# Patient Record
Sex: MALE | Race: WHITE | Employment: OTHER | URBAN - METROPOLITAN AREA
[De-identification: names, ages, dates, MRNs, and addresses within clinical notes are randomized per-mention and may not be internally consistent; named-entity substitution may affect disease eponyms.]

---

## 2024-01-01 ENCOUNTER — ANTI-COAG VISIT (OUTPATIENT)
Age: 88
End: 2024-01-01

## 2024-07-08 ENCOUNTER — OFFICE VISIT (OUTPATIENT)
Age: 89
End: 2024-07-08

## 2024-07-08 VITALS
TEMPERATURE: 98 F | HEIGHT: 71 IN | OXYGEN SATURATION: 95 % | WEIGHT: 175 LBS | BODY MASS INDEX: 24.5 KG/M2 | RESPIRATION RATE: 18 BRPM | HEART RATE: 104 BPM | DIASTOLIC BLOOD PRESSURE: 67 MMHG | SYSTOLIC BLOOD PRESSURE: 113 MMHG

## 2024-07-08 DIAGNOSIS — R07.81 RIB PAIN ON RIGHT SIDE: Primary | ICD-10-CM

## 2024-07-08 DIAGNOSIS — M43.9 COMPRESSION DEFORMITY OF VERTEBRA: ICD-10-CM

## 2024-07-08 DIAGNOSIS — Q79.1 DIAPHRAGM, EVENTRATION: ICD-10-CM

## 2024-07-08 PROBLEM — D64.9 ANEMIA: Status: ACTIVE | Noted: 2017-02-01

## 2024-07-08 PROBLEM — H52.4 PRESBYOPIA: Status: ACTIVE | Noted: 2019-10-22

## 2024-07-08 PROBLEM — H35.3290 EXUDATIVE AGE-RELATED MACULAR DEGENERATION (HCC): Status: ACTIVE | Noted: 2017-07-21

## 2024-07-08 PROBLEM — H52.223 REGULAR ASTIGMATISM OF BOTH EYES: Status: ACTIVE | Noted: 2019-10-22

## 2024-07-08 PROBLEM — Z78.9 STATIN INTOLERANCE: Status: ACTIVE | Noted: 2018-04-11

## 2024-07-08 PROBLEM — I10 PRIMARY HYPERTENSION: Status: ACTIVE | Noted: 2024-07-08

## 2024-07-08 PROBLEM — I87.2 VENOUS (PERIPHERAL) INSUFFICIENCY: Status: ACTIVE | Noted: 2017-04-19

## 2024-07-08 PROBLEM — M47.817 LUMBOSACRAL SPONDYLOSIS WITHOUT MYELOPATHY: Status: ACTIVE | Noted: 2021-07-04

## 2024-07-08 PROBLEM — Z79.01 ANTICOAGULATED ON COUMADIN: Status: ACTIVE | Noted: 2017-11-13

## 2024-07-08 RX ORDER — FUROSEMIDE 20 MG/1
20 TABLET ORAL DAILY
COMMUNITY

## 2024-07-08 RX ORDER — ALBUTEROL SULFATE 90 UG/1
2 AEROSOL, METERED RESPIRATORY (INHALATION) EVERY 6 HOURS PRN
COMMUNITY

## 2024-07-08 RX ORDER — METOPROLOL SUCCINATE 25 MG/1
25 TABLET, EXTENDED RELEASE ORAL DAILY
COMMUNITY

## 2024-07-08 RX ORDER — METHYLPREDNISOLONE 4 MG/1
4 TABLET ORAL SEE ADMIN INSTRUCTIONS
Qty: 1 KIT | Refills: 0 | Status: SHIPPED | OUTPATIENT
Start: 2024-07-08

## 2024-07-08 RX ORDER — WARFARIN SODIUM 2 MG/1
2 TABLET ORAL
COMMUNITY

## 2024-07-08 ASSESSMENT — ENCOUNTER SYMPTOMS
EYES NEGATIVE: 1
CONSTIPATION: 1
RESPIRATORY NEGATIVE: 1
BACK PAIN: 1
ALLERGIC/IMMUNOLOGIC NEGATIVE: 1

## 2024-07-08 NOTE — PROGRESS NOTES
2024   Timur Ley (: 1927) is a 89 y.o. male, New patient, here for evaluation of the following chief complaint(s):  Rib Pain (Right side flan pain radiating to ribs and lower back for the last 10 days accompanied by loss of appetite and constipation. )     ASSESSMENT/PLAN:  Below is the assessment and plan developed based on review of pertinent history, physical exam, labs, studies, and medications.  1. Rib pain on right side  -     XR RIBS RIGHT INCLUDE CHEST (MIN 3 VIEWS); Future  -     methylPREDNISolone (MEDROL DOSEPACK) 4 MG tablet; Take 1 tablet by mouth See Admin Instructions, Disp-1 kit, R-0Normal  2. Compression deformity of vertebra  -     Christiano Bhatt MD, Orthopedic Surgery (back, neck, spine), [place]  3. Diaphragm, eventration  -     Christiano Bhatt MD, Orthopedic Surgery (back, neck, spine), [place]         Handout given with care instructions  2. OTC for symptom management. Increase fluid intake, ensure adequate nutritional intake.  3. Follow up with PCP as needed.  4. Go to ED with development of any acute symptoms.     Follow up:  Return if symptoms worsen or fail to improve.  Follow up immediately for any new, worsening or changes or if symptoms are not improving over the next 5-7 days.     SUBJECTIVE/OBJECTIVE:  HPI     Diagnoses and all orders for this visit:  Rib pain on right side  Compression deformity of vertebra  Diaphragm, eventration  Rib Pain (Right side flan pain radiating to ribs and lower back for the last 10 days accompanied by loss of appetite and constipation. )    Timur Ley is a 89 y.o. male who presents for evaluation of right-sided rib and right back pain.     Onset: 10 days ago    Location: Right side back and ribs    Setting: Patient denies trauma fall or injury although bruising was noted on right side of the back    Quality of pain: Aching    Severity of pain: 8/10    Radiation: Radiates to the front ribs on the right side    Alleviating

## 2024-07-12 ENCOUNTER — OFFICE VISIT (OUTPATIENT)
Age: 89
End: 2024-07-12
Payer: MEDICARE

## 2024-07-12 VITALS
OXYGEN SATURATION: 96 % | DIASTOLIC BLOOD PRESSURE: 70 MMHG | BODY MASS INDEX: 24.58 KG/M2 | SYSTOLIC BLOOD PRESSURE: 134 MMHG | HEIGHT: 71 IN | HEART RATE: 70 BPM | WEIGHT: 175.6 LBS

## 2024-07-12 DIAGNOSIS — I48.21 PERMANENT ATRIAL FIBRILLATION (HCC): Primary | ICD-10-CM

## 2024-07-12 DIAGNOSIS — I49.5 SICK SINUS SYNDROME (HCC): ICD-10-CM

## 2024-07-12 DIAGNOSIS — I10 PRIMARY HYPERTENSION: ICD-10-CM

## 2024-07-12 DIAGNOSIS — Z95.0 PRESENCE OF CARDIAC PACEMAKER: ICD-10-CM

## 2024-07-12 PROCEDURE — G8427 DOCREV CUR MEDS BY ELIG CLIN: HCPCS | Performed by: INTERNAL MEDICINE

## 2024-07-12 PROCEDURE — 1123F ACP DISCUSS/DSCN MKR DOCD: CPT | Performed by: INTERNAL MEDICINE

## 2024-07-12 PROCEDURE — 93005 ELECTROCARDIOGRAM TRACING: CPT | Performed by: INTERNAL MEDICINE

## 2024-07-12 PROCEDURE — 99204 OFFICE O/P NEW MOD 45 MIN: CPT | Performed by: INTERNAL MEDICINE

## 2024-07-12 PROCEDURE — G8420 CALC BMI NORM PARAMETERS: HCPCS | Performed by: INTERNAL MEDICINE

## 2024-07-12 PROCEDURE — 1036F TOBACCO NON-USER: CPT | Performed by: INTERNAL MEDICINE

## 2024-07-12 PROCEDURE — 93010 ELECTROCARDIOGRAM REPORT: CPT | Performed by: INTERNAL MEDICINE

## 2024-07-12 RX ORDER — FUROSEMIDE 40 MG/1
40 TABLET ORAL DAILY
COMMUNITY

## 2024-07-12 NOTE — PATIENT INSTRUCTIONS
Setup appointment with [facility].     Order INR checks and management of Coumadin dose at [facility]    See Dr. Roper in 6 months.

## 2024-07-12 NOTE — PROGRESS NOTES
Chief Complaint   Patient presents with    Atrial Flutter    Hypertension    Atrial Fibrillation     PAF     Vitals:    07/12/24 0942   BP: 134/70   Site: Left Upper Arm   Position: Sitting   Pulse: 70   SpO2: 96%   Weight: 79.7 kg (175 lb 9.6 oz)   Height: 1.803 m (5' 11\")         Chest pain: DENIED     Recent hospital stays: DENIED     Refills: DENIED   
All negative except for HPI     Physical Exam:  /70 (Site: Left Upper Arm, Position: Sitting)   Pulse 70   Ht 1.803 m (5' 11\")   Wt 79.7 kg (175 lb 9.6 oz)   SpO2 96%   BMI 24.49 kg/m²     Gen:  Well-developed, well-nourished, in no acute distress  HEENT:  Pink conjunctivae, PERRL, hearing intact to voice, moist mucous membranes  Neck:  Supple, without masses, thyroid non-tender  Resp:  No accessory muscle use, clear breath sounds without wheezes rales or rhonchi  Card:  No murmurs, normal S1, S2 without thrills, bruits or peripheral edema  Abd:  Soft, non-tender, non-distended, normoactive bowel sounds are present, no palpable organomegaly and no detectable hernias  Lymph:  No cervical or inguinal adenopathy  Musc:  No cyanosis or clubbing  Skin:  No rashes or ulcers, skin turgor is good  Neuro:  Cranial nerves are grossly intact, no focal motor weakness, follows commands appropriately  Psych:  Good insight, oriented to person, place and time, alert     Labs:     No results found for: \"WBC\", \"WBCT\", \"WBCPOC\", \"HGB\", \"HGBPOC\", \"HCT\", \"HCTPOC\", \"PLT\", \"PLTPOC\", \"MCV\"  No results found for: \"HBA1C\", \"GLU\", \"GESTF\", \"GLUCPOC\", \"MCA2\", \"LDL\", \"KAMALJIT\", \"CREAPOC\"   No results found for: \"CHOL\", \"CHOLPOCT\", \"HDL\", \"LDL\", \"LDLCEXT\"  No results found for: \"ALTPOC\", \"ALT\", \"ASTPOC\", \"GGT\", \"ALBPOC\", \"TP\", \"INR\", \"INREXT\", \"PTINR\", \"PTEXT\", \"PLT\", \"PLTPOC\", \"HCABQL\", \"AFP\"  No results found for: \"INR\", \"INREXT\", \"PT1\"   No results found for: \"GFRAA\", \"CREAPOC\", \"BUN\", \"IBUN\", \"BUNPOC\", \"NA\", \"NAPOC\", \"K\", \"KPOCT\", \"CL\", \"CLPOC\", \"CO2\", \"CO2POC\", \"MG\", \"PHOS\", \"ALBEU\", \"PTH\", \"EPO\"  No results found for: \"PSA\", \"PSA2\", \"PSAR1\", \"PSA1\", \"PSA3\"  No results found for: \"TSH\", \"TSH2\", \"TSH3\", \"TSHELE\", \"TSHEXT\", \"T3RIA\", \"T3UP\", \"FT3\", \"FT4\", \"T4\", \"TT7\"   No results found for: \"GLU\", \"GLUCPOC\"   No results found for: \"CPK\", \"CKMB\", \"BNP\"   No results found for: \"BNP\", \"BNPPOC\", \"BNPNT\"   No results found for: \"NA\", \"K\",

## 2024-07-26 ENCOUNTER — ANTI-COAG VISIT (OUTPATIENT)
Facility: HOSPITAL | Age: 89
End: 2024-07-26
Payer: MEDICARE

## 2024-07-26 DIAGNOSIS — I48.91 ATRIAL FIBRILLATION, UNSPECIFIED TYPE (HCC): Primary | ICD-10-CM

## 2024-07-26 LAB
INTERNATIONAL NORMALIZATION RATIO, POC: 1.8 (ref 2–3)
PROTHROMBIN TIME, POC: ABNORMAL

## 2024-07-26 PROCEDURE — 99203 OFFICE O/P NEW LOW 30 MIN: CPT

## 2024-07-26 PROCEDURE — 85610 PROTHROMBIN TIME: CPT

## 2024-07-26 NOTE — PROGRESS NOTES
Agreement/Protocol:    Patient's POC INR is 1.8 which is slightly subtherapeutic for INR goal of 2-3.   Patient has a kyphoplasty scheduled 7/30/24. Spoke with Sherry Holland NP at [facility] who states to hold warfarin 3 days prior to procedure. Patient will take 1 mg of warfarin today 7/26 and then will hold warfarin 7/27, 7/28, and 7/29 prior to procedure. Patient will resume 1 mg daily on 7/31 (OK with [facility] physicians) until his appointment at [facility] on 8/5/24. See below dosing calendar provided to patient and family.   Patient's facility was called and plan was discussed. Facility labs will be discontinued as patient prefers to be monitored at [facility]. A progress note detailing the plan will be faxed to his facility for review. Patient's cardiologist Dr. Roper will also be copied on this progress note.   Called patient's pharmacy to see easier options for patient - currently with the insurance information they have on file, Eliquis will cost $546/month. Instructed daughter to bring all of the patient's insurance information to the pharmacy to ensure the best price is obtained for possible option ([place] and [employer] card).   Patient's daughter is also looking into obtaining an INR POC device to avoid commuting to [facility] or receive 5AM lab draws done by the facility as they contract with an outside company (if Eliquis remains too expensive).        Medication reconciliation was completed during the visit.      A full discussion of the nature of anticoagulants has been carried out.  A full discussion of the need for frequent and regular monitoring, precise dosage adjustment and compliance was stressed.  Side effects of potential bleeding were discussed and Mr. Ley was instructed to call [phone] if there are any signs of abnormal bleeding.  Mr. Ley was instructed to avoid any OTC items containing aspirin or ibuprofen and prior to starting any new OTC products to consult with 
Principal Discharge DX:	Urinary retention

## 2024-08-04 NOTE — PROGRESS NOTES
goal of 2-3.   Warfarin dose has been increased to 2 mg on M/W/F and 1 mg on remaining days.  Patient's facility was previously called and plan was discussed. Facility labs will be discontinued as patient prefers to be monitored at [facility]. A progress note detailing the plan will be faxed to his facility for review. Patient's cardiologist Dr. Roper will also be copied on this progress note.   Patient's daughter is also looking into obtaining an INR POC device to avoid commuting to [facility] or receive 5AM lab draws done by the facility as they contract with an outside company (if Eliquis remains too expensive).    Medication reconciliation was completed during the visit.    A full discussion of the nature of anticoagulants has been carried out.  A full discussion of the need for frequent and regular monitoring, precise dosage adjustment and compliance was stressed.  Side effects of potential bleeding were discussed and Mr. Ley was instructed to call [phone] if there are any signs of abnormal bleeding.  Mr. Ley was instructed to avoid any OTC items containing aspirin or ibuprofen and prior to starting any new OTC products to consult with his physician or pharmacist to ensure no drug interactions are present.  Mr. Ley was instructed to avoid any major changes in his general diet and to avoid alcohol consumption.    Mr. Ley was provided information in the AVS that includes topics on understanding coumadin therapy, drug interaction considerations, vitamin K and coumadin use, interactions with foods and supplements containing vitamin K, and the use of herbal products.    Mr. Ley verbalized his understanding of all instructions and will call the office with any questions, concerns, or signs of abnormal bleeding or blood clot.  Notifications of recommendations will be faxed to Jkae Coleman NP at [facility] for review.    Thank you,  Erika Raman [facility]        For Pharmacy Admin

## 2024-08-05 ENCOUNTER — ANTI-COAG VISIT (OUTPATIENT)
Facility: HOSPITAL | Age: 89
End: 2024-08-05
Payer: MEDICARE

## 2024-08-05 DIAGNOSIS — I48.91 ATRIAL FIBRILLATION, UNSPECIFIED TYPE (HCC): Primary | ICD-10-CM

## 2024-08-05 LAB
INTERNATIONAL NORMALIZATION RATIO, POC: 1.6 (ref 2–3)
PROTHROMBIN TIME, POC: ABNORMAL

## 2024-08-05 PROCEDURE — 85610 PROTHROMBIN TIME: CPT

## 2024-08-05 PROCEDURE — 99212 OFFICE O/P EST SF 10 MIN: CPT

## 2024-08-09 ENCOUNTER — TELEPHONE (OUTPATIENT)
Facility: HOSPITAL | Age: 89
End: 2024-08-09

## 2024-08-09 NOTE — TELEPHONE ENCOUNTER
[facility]    Pharmacy Progress Note - Telephone Encounter    S/O: Concepcion, daughter of MrBenedict Ley, contacted clinic regarding obtaining a INR POC device and no longer needing [facility] services. Per daughter, patient will now have his INR monitored remotely through his cardiologist and his cardiologist will adjust his warfarin.     [facility] signing off patient.         Katja Mustafa [facility]      For Pharmacy Admin Tracking Only    Intervention Detail: Adherence Monitorin  Total # of Interventions Recommended: 1  Total # of Interventions Accepted: 1  Time Spent (min): 30

## 2024-08-12 LAB — INR BLD: 2.9 (ref 2–3)

## 2024-08-13 ENCOUNTER — ANTI-COAG VISIT (OUTPATIENT)
Age: 89
End: 2024-08-13

## 2024-08-21 ENCOUNTER — ANTI-COAG VISIT (OUTPATIENT)
Age: 89
End: 2024-08-21

## 2024-08-21 LAB — INR BLD: 2.8

## 2024-08-21 PROCEDURE — PBSHW PROTIME-INR: Performed by: INTERNAL MEDICINE

## 2024-08-28 ENCOUNTER — OFFICE VISIT (OUTPATIENT)
Age: 89
End: 2024-08-28

## 2024-08-28 VITALS
SYSTOLIC BLOOD PRESSURE: 118 MMHG | WEIGHT: 174 LBS | TEMPERATURE: 97.5 F | HEART RATE: 70 BPM | BODY MASS INDEX: 24.36 KG/M2 | HEIGHT: 71 IN | DIASTOLIC BLOOD PRESSURE: 63 MMHG | OXYGEN SATURATION: 94 %

## 2024-08-28 DIAGNOSIS — L89.151 PRESSURE INJURY OF SACRAL REGION, STAGE 1: Primary | ICD-10-CM

## 2024-08-28 LAB — INR BLD: 2.6

## 2024-08-28 RX ORDER — ZINC OXIDE 100 MG/G
1 CREAM TOPICAL PRN
Qty: 50 G | Refills: 0 | Status: SHIPPED | OUTPATIENT
Start: 2024-08-28

## 2024-08-29 ENCOUNTER — TELEPHONE (OUTPATIENT)
Age: 89
End: 2024-08-29

## 2024-08-29 ENCOUNTER — ANTI-COAG VISIT (OUTPATIENT)
Age: 89
End: 2024-08-29

## 2024-08-29 PROCEDURE — PBSHW PROTIME-INR: Performed by: INTERNAL MEDICINE

## 2024-08-29 NOTE — TELEPHONE ENCOUNTER
Eden, PT called to give up date on patient's heart rate during PT sessions, it will bottom out at the beginning of the sessions going as low as 25-40, recovers really quickly, it is normally around 75 but can get as high as 100 while walking.             [facility]  [phone]/Eden

## 2024-09-01 LAB — INR BLD: 2.1

## 2024-09-04 NOTE — TELEPHONE ENCOUNTER
Eden, PT called to give up date on patient's heart rate during PT sessions, it will bottom out at the beginning of the sessions going as low as 25-40, recovers really quickly, it is normally around 75 but can get as high as 100 while walking.             [facility]  [phone]/Eden    ################################    Unable to reachPT, Eden Message left detailed message explaining That what the pt is experiencing is his PPM triggering in response to the drop in HR. This is what you want to happen. I also explained that we can not interigate his device since he is not yet established with out office. Whomever was managing his device would have gotten trigger notifications and been in touch with the pt should a correction have been needed.

## 2024-09-11 LAB — INR BLD: 1.7

## 2024-09-13 ENCOUNTER — ANTI-COAG VISIT (OUTPATIENT)
Age: 89
End: 2024-09-13

## 2024-09-13 PROCEDURE — PBSHW PROTIME-INR: Performed by: INTERNAL MEDICINE

## 2024-09-16 LAB
INR BLD: 2.7
INR BLD: 2.7 (ref 2–3)

## 2024-09-21 ASSESSMENT — ENCOUNTER SYMPTOMS: COLOR CHANGE: 1

## 2024-09-26 ENCOUNTER — TELEPHONE (OUTPATIENT)
Age: 88
End: 2024-09-26

## 2024-09-26 ENCOUNTER — TELEPHONE (OUTPATIENT)
Age: 89
End: 2024-09-26

## 2024-09-26 NOTE — TELEPHONE ENCOUNTER
Patient son called today in regards of a [facility] cardiologist specialist alert and he would like to know if his fathers appointment could be moved to a sooner date. I tried to move the appointments to a sooner date but there was none available. He would like a call from the nurse regarding what could be done.       Patient son # [phone]

## 2024-10-01 ENCOUNTER — ANTI-COAG VISIT (OUTPATIENT)
Age: 89
End: 2024-10-01

## 2024-10-01 LAB — INR BLD: 2.1 (ref 2–3)

## 2024-10-15 LAB — INR BLD: 1.8

## 2024-10-16 ENCOUNTER — ANTI-COAG VISIT (OUTPATIENT)
Age: 89
End: 2024-10-16

## 2024-10-22 ENCOUNTER — APPOINTMENT (OUTPATIENT)
Facility: HOSPITAL | Age: 89
DRG: 291 | End: 2024-10-22
Payer: MEDICARE

## 2024-10-22 ENCOUNTER — APPOINTMENT (OUTPATIENT)
Facility: HOSPITAL | Age: 89
DRG: 291 | End: 2024-10-22
Attending: HOSPITALIST
Payer: MEDICARE

## 2024-10-22 ENCOUNTER — PROCEDURE VISIT (OUTPATIENT)
Age: 89
End: 2024-10-22
Payer: MEDICARE

## 2024-10-22 ENCOUNTER — HOSPITAL ENCOUNTER (INPATIENT)
Facility: HOSPITAL | Age: 89
LOS: 6 days | Discharge: HOSPICE | DRG: 291 | End: 2024-10-28
Attending: EMERGENCY MEDICINE | Admitting: HOSPITALIST
Payer: MEDICARE

## 2024-10-22 ENCOUNTER — OFFICE VISIT (OUTPATIENT)
Age: 89
End: 2024-10-22
Payer: MEDICARE

## 2024-10-22 VITALS
SYSTOLIC BLOOD PRESSURE: 130 MMHG | DIASTOLIC BLOOD PRESSURE: 82 MMHG | OXYGEN SATURATION: 93 % | HEIGHT: 71 IN | BODY MASS INDEX: 24.69 KG/M2 | HEART RATE: 82 BPM | WEIGHT: 176.4 LBS

## 2024-10-22 DIAGNOSIS — Z95.0 PRESENCE OF CARDIAC PACEMAKER: ICD-10-CM

## 2024-10-22 DIAGNOSIS — I50.9 CHRONIC CONGESTIVE HEART FAILURE, UNSPECIFIED HEART FAILURE TYPE (HCC): Primary | ICD-10-CM

## 2024-10-22 DIAGNOSIS — I48.0 PAROXYSMAL ATRIAL FIBRILLATION (HCC): ICD-10-CM

## 2024-10-22 DIAGNOSIS — I49.5 SICK SINUS SYNDROME (HCC): Primary | ICD-10-CM

## 2024-10-22 DIAGNOSIS — Z79.01 ANTICOAGULATED ON COUMADIN: ICD-10-CM

## 2024-10-22 DIAGNOSIS — Z95.0 CARDIAC PACEMAKER IN SITU: Primary | ICD-10-CM

## 2024-10-22 PROBLEM — R06.02 SHORTNESS OF BREATH: Status: ACTIVE | Noted: 2024-10-22

## 2024-10-22 LAB
ALBUMIN SERPL-MCNC: 2.9 G/DL (ref 3.5–5)
ALBUMIN/GLOB SERPL: 0.6 (ref 1.1–2.2)
ALP SERPL-CCNC: 98 U/L (ref 45–117)
ALT SERPL-CCNC: 14 U/L (ref 12–78)
ANION GAP SERPL CALC-SCNC: 0 MMOL/L (ref 2–12)
AST SERPL-CCNC: 16 U/L (ref 15–37)
BASOPHILS # BLD: 0 K/UL (ref 0–0.1)
BASOPHILS NFR BLD: 0 % (ref 0–1)
BILIRUB SERPL-MCNC: 0.6 MG/DL (ref 0.2–1)
BUN SERPL-MCNC: 27 MG/DL (ref 6–20)
BUN/CREAT SERPL: 19 (ref 12–20)
CALCIUM SERPL-MCNC: 8.8 MG/DL (ref 8.5–10.1)
CHLORIDE SERPL-SCNC: 102 MMOL/L (ref 97–108)
CO2 SERPL-SCNC: 37 MMOL/L (ref 21–32)
COMMENT:: NORMAL
CREAT SERPL-MCNC: 1.43 MG/DL (ref 0.7–1.3)
DIFFERENTIAL METHOD BLD: ABNORMAL
ECHO AO ROOT DIAM: 3.3 CM
ECHO AO ROOT INDEX: 1.67 CM/M2
ECHO AV AREA PEAK VELOCITY: 1.1 CM2
ECHO AV AREA VTI: 1 CM2
ECHO AV AREA/BSA PEAK VELOCITY: 0.6 CM2/M2
ECHO AV AREA/BSA VTI: 0.5 CM2/M2
ECHO AV MEAN GRADIENT: 3 MMHG
ECHO AV MEAN VELOCITY: 0.7 M/S
ECHO AV PEAK GRADIENT: 5 MMHG
ECHO AV PEAK VELOCITY: 1.1 M/S
ECHO AV VELOCITY RATIO: 0.45
ECHO AV VTI: 19.6 CM
ECHO BSA: 1.98 M2
ECHO LA DIAMETER INDEX: 2.12 CM/M2
ECHO LA DIAMETER: 4.2 CM
ECHO LA TO AORTIC ROOT RATIO: 1.27
ECHO LA VOL A-L A4C: 58 ML (ref 18–58)
ECHO LA VOL MOD A4C: 53 ML (ref 18–58)
ECHO LA VOLUME INDEX A-L A4C: 29 ML/M2 (ref 16–34)
ECHO LA VOLUME INDEX MOD A4C: 27 ML/M2 (ref 16–34)
ECHO LV EF PHYSICIAN: 25 %
ECHO LVOT AREA: 2.3 CM2
ECHO LVOT AV VTI INDEX: 0.42
ECHO LVOT DIAM: 1.7 CM
ECHO LVOT MEAN GRADIENT: 1 MMHG
ECHO LVOT PEAK GRADIENT: 1 MMHG
ECHO LVOT PEAK VELOCITY: 0.5 M/S
ECHO LVOT STROKE VOLUME INDEX: 9.4 ML/M2
ECHO LVOT SV: 18.6 ML
ECHO LVOT VTI: 8.2 CM
ECHO PV MAX VELOCITY: 0.9 M/S
ECHO PV PEAK GRADIENT: 3 MMHG
ECHO RV FREE WALL PEAK S': 8.1 CM/S
ECHO RV INTERNAL DIMENSION: 3.1 CM
ECHO RV TAPSE: 1.1 CM (ref 1.7–?)
ECHO TV REGURGITANT MAX VELOCITY: 2.85 M/S
ECHO TV REGURGITANT PEAK GRADIENT: 32 MMHG
EOSINOPHIL # BLD: 0.1 K/UL (ref 0–0.4)
EOSINOPHIL NFR BLD: 1 % (ref 0–7)
ERYTHROCYTE [DISTWIDTH] IN BLOOD BY AUTOMATED COUNT: 14.1 % (ref 11.5–14.5)
GLOBULIN SER CALC-MCNC: 4.8 G/DL (ref 2–4)
GLUCOSE SERPL-MCNC: 128 MG/DL (ref 65–100)
HCT VFR BLD AUTO: 37.3 % (ref 36.6–50.3)
HGB BLD-MCNC: 11.5 G/DL (ref 12.1–17)
IMM GRANULOCYTES # BLD AUTO: 0 K/UL (ref 0–0.04)
IMM GRANULOCYTES NFR BLD AUTO: 0 % (ref 0–0.5)
INR PPP: 2.5 (ref 0.9–1.1)
LYMPHOCYTES # BLD: 1.2 K/UL (ref 0.8–3.5)
LYMPHOCYTES NFR BLD: 25 % (ref 12–49)
MCH RBC QN AUTO: 30.3 PG (ref 26–34)
MCHC RBC AUTO-ENTMCNC: 30.8 G/DL (ref 30–36.5)
MCV RBC AUTO: 98.4 FL (ref 80–99)
MONOCYTES # BLD: 0.8 K/UL (ref 0–1)
MONOCYTES NFR BLD: 17 % (ref 5–13)
NEUTS SEG # BLD: 2.6 K/UL (ref 1.8–8)
NEUTS SEG NFR BLD: 57 % (ref 32–75)
NRBC # BLD: 0 K/UL (ref 0–0.01)
NRBC BLD-RTO: 0 PER 100 WBC
NT PRO BNP: 1093 PG/ML
PLATELET # BLD AUTO: 258 K/UL (ref 150–400)
PMV BLD AUTO: 9.3 FL (ref 8.9–12.9)
POTASSIUM SERPL-SCNC: 4.2 MMOL/L (ref 3.5–5.1)
PROT SERPL-MCNC: 7.7 G/DL (ref 6.4–8.2)
PROTHROMBIN TIME: 24.5 SEC (ref 9–11.1)
RBC # BLD AUTO: 3.79 M/UL (ref 4.1–5.7)
SODIUM SERPL-SCNC: 139 MMOL/L (ref 136–145)
SPECIMEN HOLD: NORMAL
TROPONIN I SERPL HS-MCNC: 20 NG/L (ref 0–76)
WBC # BLD AUTO: 4.6 K/UL (ref 4.1–11.1)

## 2024-10-22 PROCEDURE — 84484 ASSAY OF TROPONIN QUANT: CPT

## 2024-10-22 PROCEDURE — 6370000000 HC RX 637 (ALT 250 FOR IP): Performed by: HOSPITALIST

## 2024-10-22 PROCEDURE — 6360000002 HC RX W HCPCS: Performed by: EMERGENCY MEDICINE

## 2024-10-22 PROCEDURE — 6360000004 HC RX CONTRAST MEDICATION: Performed by: HOSPITALIST

## 2024-10-22 PROCEDURE — 93306 TTE W/DOPPLER COMPLETE: CPT | Performed by: STUDENT IN AN ORGANIZED HEALTH CARE EDUCATION/TRAINING PROGRAM

## 2024-10-22 PROCEDURE — APPSS45 APP SPLIT SHARED TIME 31-45 MINUTES: Performed by: NURSE PRACTITIONER

## 2024-10-22 PROCEDURE — 85025 COMPLETE CBC W/AUTO DIFF WBC: CPT

## 2024-10-22 PROCEDURE — 99285 EMERGENCY DEPT VISIT HI MDM: CPT

## 2024-10-22 PROCEDURE — 36415 COLL VENOUS BLD VENIPUNCTURE: CPT

## 2024-10-22 PROCEDURE — 99215 OFFICE O/P EST HI 40 MIN: CPT | Performed by: HOSPITALIST

## 2024-10-22 PROCEDURE — 83880 ASSAY OF NATRIURETIC PEPTIDE: CPT

## 2024-10-22 PROCEDURE — 71046 X-RAY EXAM CHEST 2 VIEWS: CPT

## 2024-10-22 PROCEDURE — C8929 TTE W OR WO FOL WCON,DOPPLER: HCPCS

## 2024-10-22 PROCEDURE — 2060000000 HC ICU INTERMEDIATE R&B

## 2024-10-22 PROCEDURE — 71250 CT THORAX DX C-: CPT

## 2024-10-22 PROCEDURE — 85610 PROTHROMBIN TIME: CPT

## 2024-10-22 PROCEDURE — 80053 COMPREHEN METABOLIC PANEL: CPT

## 2024-10-22 PROCEDURE — 6360000002 HC RX W HCPCS: Performed by: HOSPITALIST

## 2024-10-22 PROCEDURE — 93005 ELECTROCARDIOGRAM TRACING: CPT | Performed by: EMERGENCY MEDICINE

## 2024-10-22 PROCEDURE — 93280 PM DEVICE PROGR EVAL DUAL: CPT | Performed by: HOSPITALIST

## 2024-10-22 RX ORDER — METOPROLOL SUCCINATE 50 MG/1
50 TABLET, EXTENDED RELEASE ORAL DAILY
Status: DISCONTINUED | OUTPATIENT
Start: 2024-10-23 | End: 2024-10-27

## 2024-10-22 RX ORDER — WARFARIN SODIUM 2 MG/1
2 TABLET ORAL
Status: DISCONTINUED | OUTPATIENT
Start: 2024-10-23 | End: 2024-10-22

## 2024-10-22 RX ORDER — FUROSEMIDE 10 MG/ML
40 INJECTION INTRAMUSCULAR; INTRAVENOUS ONCE
Status: COMPLETED | OUTPATIENT
Start: 2024-10-22 | End: 2024-10-22

## 2024-10-22 RX ORDER — IPRATROPIUM BROMIDE AND ALBUTEROL SULFATE 2.5; .5 MG/3ML; MG/3ML
1 SOLUTION RESPIRATORY (INHALATION) EVERY 6 HOURS PRN
Status: DISCONTINUED | OUTPATIENT
Start: 2024-10-22 | End: 2024-10-28 | Stop reason: HOSPADM

## 2024-10-22 RX ORDER — FUROSEMIDE 10 MG/ML
40 INJECTION INTRAMUSCULAR; INTRAVENOUS 2 TIMES DAILY
Status: DISCONTINUED | OUTPATIENT
Start: 2024-10-22 | End: 2024-10-27

## 2024-10-22 RX ORDER — POTASSIUM CHLORIDE 750 MG/1
20 TABLET, EXTENDED RELEASE ORAL DAILY
Status: DISCONTINUED | OUTPATIENT
Start: 2024-10-23 | End: 2024-10-27

## 2024-10-22 RX ORDER — WARFARIN SODIUM 1 MG/1
1 TABLET ORAL
Status: DISCONTINUED | OUTPATIENT
Start: 2024-10-22 | End: 2024-10-22

## 2024-10-22 RX ORDER — WARFARIN SODIUM 1 MG/1
1 TABLET ORAL
Status: COMPLETED | OUTPATIENT
Start: 2024-10-22 | End: 2024-10-22

## 2024-10-22 RX ADMIN — FUROSEMIDE 40 MG: 10 INJECTION, SOLUTION INTRAMUSCULAR; INTRAVENOUS at 13:34

## 2024-10-22 RX ADMIN — PERFLUTREN 1.5 ML: 6.52 INJECTION, SUSPENSION INTRAVENOUS at 15:49

## 2024-10-22 RX ADMIN — WARFARIN SODIUM 1 MG: 1 TABLET ORAL at 17:37

## 2024-10-22 RX ADMIN — FUROSEMIDE 40 MG: 10 INJECTION, SOLUTION INTRAMUSCULAR; INTRAVENOUS at 17:39

## 2024-10-22 ASSESSMENT — PAIN - FUNCTIONAL ASSESSMENT: PAIN_FUNCTIONAL_ASSESSMENT: 0-10

## 2024-10-22 ASSESSMENT — PAIN SCALES - GENERAL: PAINLEVEL_OUTOF10: 0

## 2024-10-22 NOTE — PROGRESS NOTES
Chief Complaint   Patient presents with    Other     PAF  SSS     Vitals:    10/22/24 0908   BP: 130/82   Site: Left Upper Arm   Position: Sitting   Pulse: 82   SpO2: 93%   Weight: 80 kg (176 lb 6.4 oz)   Height: 1.803 m (5' 11\")     Chest pain: DENIED     Recent hospital stays: DENIED     Refills: DENIED

## 2024-10-22 NOTE — ED TRIAGE NOTES
Pt arrives to ED from Cardiology where pt was sent for worsening SOB over the last week in acute heart failure.  Pt reports \"after putting on one sock I am completely wiped out\".  Pt unable to finish complete sentence without taking a breath in triage.  Pt has 3+ pitting edema in bilateral legs with weeping (that is new).  Pt denies CP, NVD, fevers.  Dr. Bradshaw recommending echo, labs and fluids for pt.  Pt denies any home O2.

## 2024-10-22 NOTE — PROGRESS NOTES
LIZBETH PREP NOTE  Please fill out subjective and AF history below.  ***Dr. Bradshaw will DELETE before signing visit  ===========================================    Primary Cardiologist: Brett Roper MD    He is retired.    Timur Ley presents to electrophysiology clinic for management of CIED (Cardiac Implantable Electronic Device).    His current medical conditions and PMH are detailed below.      Today's visit:  Date: 10/22/2024     PMH dual chamber pacemaker, permanent AF, HTN, and anticoagulation with warfarin. He recently moved from the [facility] in the Summer of 2024. He lives in an Independent Living Facility. He is now followed by Dr. Roper and has been referred to establish EP care.     He is accompanied by his daughter in law.     He has had worsening shortness of breath and is using his inhaler more.+ He denies orthopnea. He does occasionally note PND. He can be short of breath putting only one pant leg on. His is slowly losing weight. Bilateral LE edema chronic but worse recently.       # Paroxysmal Atrial Fibrillation/Flutter  [id number] score of 3 [for HTN and Age-2]  Anticoagulation: warfarin  Onset: unknown  Alcohol: none  Prior AAD: none  Current Meds/AAD: metoprolol   Prior Cardioversion: multiple  Ablation: none  LA size: mildly dilated by echo 2021    [employer] Dual Chamber Pacemaker   Normal lead and device function   6.7 years on battery   Less than 1% A-paced  97% V-paced  Less than 1%     Acute CHF  - Worsening LE edema and dyspnea  - Orthopnea immediately upon laying down  - Significant JVD on exam  - Update echocardiogram, could have pacemaker-mediated cardiomyopathy     Anticoagulation  - Continue warfarin  - Denies of any bleeding issues. Know to contact clinic with any bleeding side effects (BRBPR, melena, hemoptysis, hematuria).    COPD  Prior tobacco use   He will establish care with local Pulmonologist

## 2024-10-22 NOTE — PLAN OF CARE
Problem: Discharge Planning  Goal: Discharge to home or other facility with appropriate resources  Outcome: /[facility] Progressing     Problem: Pain  Goal: Verbalizes/displays adequate comfort level or baseline comfort level  Outcome: /[facility] Progressing     Problem: ABCDS Injury Assessment  Goal: Absence of physical injury  Outcome: /[facility] Progressing

## 2024-10-22 NOTE — H&P
overload.  The patient's Lasix will be changed to IV 40 mg twice daily.  We will monitor intake and output.  2. Obtain echocardiogram.  3. Consult Cardiology.  The patient sees Dr. Bradshaw.  4. History of atrial fibrillation.  INR is therapeutic.  We will continue current dose.  5. History of chronic obstructive pulmonary disease.  No significant wheezing.  We will continue on albuterol.  6. History of recent kyphoplasty.  No significant back pain at this time.  7. Pulmonary nodule in the right lung, which will need followup.  8. DVT prophylaxis not needed as the patient is already on Coumadin and INR is therapeutic.        MD LASHONDA JAIMES/TYSHAWN  D:  10/22/2024 13:14:42  T:  10/22/2024 13:35:19  JOB #:  [id number]

## 2024-10-22 NOTE — ED NOTES
TRANSFER - OUT REPORT:    Verbal report given to Perla MARTINEZ on Timur Ley  being transferred to [facility] for routine progression of patient care       Report consisted of patient's Situation, Background, Assessment and   Recommendations(SBAR).     Information from the following report(s) Nurse Handoff Report, ED Encounter Summary, ED SBAR, Adult Overview, Intake/Output, MAR, and Recent Results was reviewed with the receiving nurse.    [place] Assessment:                           Lines:   Peripheral IV 10/22/24 Right Antecubital (Active)   Site Assessment Clean, dry & intact 10/22/24 1056   Line Status Blood return noted 10/22/24 1056   Line Care Cap changed 10/22/24 1056   Phlebitis Assessment No symptoms 10/22/24 1056   Infiltration Assessment 0 10/22/24 1056        Opportunity for questions and clarification was provided.      Patient transported with:  Tech

## 2024-10-23 LAB
ALBUMIN SERPL-MCNC: 2.8 G/DL (ref 3.5–5)
ALBUMIN/GLOB SERPL: 0.6 (ref 1.1–2.2)
ALP SERPL-CCNC: 100 U/L (ref 45–117)
ALT SERPL-CCNC: 15 U/L (ref 12–78)
ANION GAP SERPL CALC-SCNC: 1 MMOL/L (ref 2–12)
AST SERPL-CCNC: 17 U/L (ref 15–37)
BASOPHILS # BLD: 0 K/UL (ref 0–0.1)
BASOPHILS NFR BLD: 1 % (ref 0–1)
BILIRUB SERPL-MCNC: 0.6 MG/DL (ref 0.2–1)
BUN SERPL-MCNC: 27 MG/DL (ref 6–20)
BUN/CREAT SERPL: 18 (ref 12–20)
CALCIUM SERPL-MCNC: 8.9 MG/DL (ref 8.5–10.1)
CHLORIDE SERPL-SCNC: 101 MMOL/L (ref 97–108)
CO2 SERPL-SCNC: 38 MMOL/L (ref 21–32)
CREAT SERPL-MCNC: 1.52 MG/DL (ref 0.7–1.3)
DIFFERENTIAL METHOD BLD: ABNORMAL
EKG ATRIAL RATE: 102 BPM
EKG DIAGNOSIS: NORMAL
EKG Q-T INTERVAL: 390 MS
EKG QRS DURATION: 164 MS
EKG QTC CALCULATION (BAZETT): 518 MS
EKG R AXIS: -32 DEGREES
EKG T AXIS: 147 DEGREES
EKG VENTRICULAR RATE: 106 BPM
EOSINOPHIL # BLD: 0.1 K/UL (ref 0–0.4)
EOSINOPHIL NFR BLD: 1 % (ref 0–7)
ERYTHROCYTE [DISTWIDTH] IN BLOOD BY AUTOMATED COUNT: 14.3 % (ref 11.5–14.5)
GLOBULIN SER CALC-MCNC: 5 G/DL (ref 2–4)
GLUCOSE SERPL-MCNC: 126 MG/DL (ref 65–100)
HCT VFR BLD AUTO: 39.4 % (ref 36.6–50.3)
HGB BLD-MCNC: 11.6 G/DL (ref 12.1–17)
IMM GRANULOCYTES # BLD AUTO: 0 K/UL (ref 0–0.04)
IMM GRANULOCYTES NFR BLD AUTO: 0 % (ref 0–0.5)
INR PPP: 2.7 (ref 0.9–1.1)
LYMPHOCYTES # BLD: 1.5 K/UL (ref 0.8–3.5)
LYMPHOCYTES NFR BLD: 31 % (ref 12–49)
MCH RBC QN AUTO: 29.7 PG (ref 26–34)
MCHC RBC AUTO-ENTMCNC: 29.4 G/DL (ref 30–36.5)
MCV RBC AUTO: 101 FL (ref 80–99)
MONOCYTES # BLD: 0.7 K/UL (ref 0–1)
MONOCYTES NFR BLD: 15 % (ref 5–13)
NEUTS SEG # BLD: 2.6 K/UL (ref 1.8–8)
NEUTS SEG NFR BLD: 52 % (ref 32–75)
NRBC # BLD: 0 K/UL (ref 0–0.01)
NRBC BLD-RTO: 0 PER 100 WBC
PLATELET # BLD AUTO: 239 K/UL (ref 150–400)
PMV BLD AUTO: 9.2 FL (ref 8.9–12.9)
POTASSIUM SERPL-SCNC: 4.2 MMOL/L (ref 3.5–5.1)
PROT SERPL-MCNC: 7.8 G/DL (ref 6.4–8.2)
PROTHROMBIN TIME: 26.1 SEC (ref 9–11.1)
RBC # BLD AUTO: 3.9 M/UL (ref 4.1–5.7)
SODIUM SERPL-SCNC: 140 MMOL/L (ref 136–145)
WBC # BLD AUTO: 4.9 K/UL (ref 4.1–11.1)

## 2024-10-23 PROCEDURE — 2060000000 HC ICU INTERMEDIATE R&B

## 2024-10-23 PROCEDURE — 2700000000 HC OXYGEN THERAPY PER DAY

## 2024-10-23 PROCEDURE — 85025 COMPLETE CBC W/AUTO DIFF WBC: CPT

## 2024-10-23 PROCEDURE — 94761 N-INVAS EAR/PLS OXIMETRY MLT: CPT

## 2024-10-23 PROCEDURE — 6370000000 HC RX 637 (ALT 250 FOR IP): Performed by: HOSPITALIST

## 2024-10-23 PROCEDURE — 85610 PROTHROMBIN TIME: CPT

## 2024-10-23 PROCEDURE — 6360000002 HC RX W HCPCS: Performed by: HOSPITALIST

## 2024-10-23 PROCEDURE — 94640 AIRWAY INHALATION TREATMENT: CPT

## 2024-10-23 PROCEDURE — 93010 ELECTROCARDIOGRAM REPORT: CPT | Performed by: SPECIALIST

## 2024-10-23 PROCEDURE — 36415 COLL VENOUS BLD VENIPUNCTURE: CPT

## 2024-10-23 PROCEDURE — 6370000000 HC RX 637 (ALT 250 FOR IP): Performed by: FAMILY MEDICINE

## 2024-10-23 PROCEDURE — APPSS30 APP SPLIT SHARED TIME 16-30 MINUTES: Performed by: NURSE PRACTITIONER

## 2024-10-23 PROCEDURE — 80053 COMPREHEN METABOLIC PANEL: CPT

## 2024-10-23 RX ORDER — WARFARIN SODIUM 1 MG/1
1 TABLET ORAL
Status: COMPLETED | OUTPATIENT
Start: 2024-10-23 | End: 2024-10-23

## 2024-10-23 RX ADMIN — METOPROLOL SUCCINATE 50 MG: 50 TABLET, EXTENDED RELEASE ORAL at 08:41

## 2024-10-23 RX ADMIN — WARFARIN SODIUM 1 MG: 1 TABLET ORAL at 17:34

## 2024-10-23 RX ADMIN — FUROSEMIDE 40 MG: 10 INJECTION, SOLUTION INTRAMUSCULAR; INTRAVENOUS at 08:41

## 2024-10-23 RX ADMIN — POTASSIUM CHLORIDE 20 MEQ: 750 TABLET, EXTENDED RELEASE ORAL at 08:41

## 2024-10-23 RX ADMIN — FUROSEMIDE 40 MG: 10 INJECTION, SOLUTION INTRAMUSCULAR; INTRAVENOUS at 17:41

## 2024-10-23 RX ADMIN — IPRATROPIUM BROMIDE AND ALBUTEROL SULFATE 1 DOSE: 2.5; .5 SOLUTION RESPIRATORY (INHALATION) at 21:05

## 2024-10-23 NOTE — PLAN OF CARE
Problem: Discharge Planning  Goal: Discharge to home or other facility with appropriate resources  Outcome: Progressing  Flowsheets (Taken 10/22/2024 2100 by Janey Taveras RN)  Discharge to home or other facility with appropriate resources: Identify barriers to discharge with patient and caregiver     Problem: Pain  Goal: Verbalizes/displays adequate comfort level or baseline comfort level  10/23/2024 1049 by Kelly Barrow RN  Outcome: Progressing  10/23/2024 0125 by Janey Taveras RN  Outcome: Progressing  Flowsheets (Taken 10/22/2024 1915)  Verbalizes/displays adequate comfort level or baseline comfort level: Encourage patient to monitor pain and request assistance     Problem: ABCDS Injury Assessment  Goal: Absence of physical injury  10/23/2024 1049 by Kelly Barrow RN  Outcome: Progressing  10/23/2024 0125 by Janey Taveras RN  Outcome: Progressing     Problem: Safety - Adult  Goal: Free from fall injury  10/23/2024 1049 by Kelly Barrow RN  Outcome: Progressing  10/23/2024 0125 by Janey Taveras RN  Outcome: Progressing     Problem: Chronic Conditions and Co-morbidities  Goal: Patient's chronic conditions and co-morbidity symptoms are monitored and maintained or improved  10/23/2024 1049 by Kelly Barrow RN  Outcome: Progressing  10/23/2024 0125 by Janey Taveras RN  Outcome: Progressing  Flowsheets (Taken 10/22/2024 2100)  Care Plan - Patient's Chronic Conditions and Co-Morbidity Symptoms are Monitored and Maintained or Improved: Monitor and assess patient's chronic conditions and comorbid symptoms for stability, deterioration, or improvement

## 2024-10-23 NOTE — PLAN OF CARE
Problem: Pain  Goal: Verbalizes/displays adequate comfort level or baseline comfort level  10/23/2024 0125 by Janey Taveras RN  Outcome: Progressing  10/22/2024 1512 by Perla Mullins RN  Outcome: /[facility] Progressing     Problem: ABCDS Injury Assessment  Goal: Absence of physical injury  10/23/2024 0125 by Janey Taveras RN  Outcome: Progressing  10/22/2024 1512 by Perla Mullins RN  Outcome: /[facility] Progressing     Problem: Safety - Adult  Goal: Free from fall injury  Outcome: Progressing     Problem: Chronic Conditions and Co-morbidities  Goal: Patient's chronic conditions and co-morbidity symptoms are monitored and maintained or improved  Outcome: Progressing  Flowsheets (Taken 10/22/2024 2100)  Care Plan - Patient's Chronic Conditions and Co-Morbidity Symptoms are Monitored and Maintained or Improved: Monitor and assess patient's chronic conditions and comorbid symptoms for stability, deterioration, or improvement

## 2024-10-23 NOTE — PROGRESS NOTES
Cardiac Electrophysiology OFFICE Consultation Note     Subjective:      Timur Ley is a very pleasant 89 y.o. male.  He is a resident of [facility]    Primary Cardiologist: Brett Roper MD    He is retired.  He lives with his wife in an independent living facility.  His son and daughter live close by.  He previously [facility] to [facility] in [facility].    Timur Ley presents to electrophysiology clinic for management of CIED (Cardiac Implantable Electronic Device).    His current medical conditions and PMH are detailed below.      Today's visit:  Date: 10/22/2024     Comes to EP clinic today to establish care.  He has a dual chamber pacemaker, permanent AF, HTN, and anticoagulation with warfarin. He recently moved from the [facility] in the Summer of 2024. He lives in an Independent Living Facility. He is now followed by Dr. Roper and has been referred to establish EP care.     He is accompanied by his daughter in law.     He has had worsening shortness of breath and is using his inhaler more. He does occasionally note PND. He can be short of breath putting only one pant leg on. His is slowly losing weight. Bilateral LE edema chronic but worse recently.  His legs have been significant distended and has weeping venous varicosities.           Assessment:       ICD-10-CM    1. Sick sinus syndrome (HCC)  I49.5 CANCELED: EKG 12 Lead      2. Presence of cardiac pacemaker  Z95.0       3. Paroxysmal atrial fibrillation (HCC)  I48.0       4. Anticoagulated on Coumadin  Z79.01             EKG reviewed from 7/12/2024 shows ventricular paced rhythm, underlying rhythm difficult determine.  Possible atypical atrial flutter.      # Acute decompensated heart failure  He is significantly volume overloaded with elevated JVP, orthopnea, extensive lower extremity edema.  He became very short of breath on moving to the exam table and attempting to lay down.  He has worsening dyspnea on exertion on limited

## 2024-10-24 LAB
ALBUMIN SERPL-MCNC: 2.6 G/DL (ref 3.5–5)
ALBUMIN/GLOB SERPL: 0.7 (ref 1.1–2.2)
ALP SERPL-CCNC: 91 U/L (ref 45–117)
ALT SERPL-CCNC: 12 U/L (ref 12–78)
ANION GAP SERPL CALC-SCNC: 0 MMOL/L (ref 2–12)
ANION GAP SERPL CALC-SCNC: 0 MMOL/L (ref 2–12)
AST SERPL-CCNC: 12 U/L (ref 15–37)
BASOPHILS # BLD: 0 K/UL (ref 0–0.1)
BASOPHILS NFR BLD: 0 % (ref 0–1)
BILIRUB SERPL-MCNC: 0.5 MG/DL (ref 0.2–1)
BUN SERPL-MCNC: 28 MG/DL (ref 6–20)
BUN SERPL-MCNC: 28 MG/DL (ref 6–20)
BUN/CREAT SERPL: 20 (ref 12–20)
BUN/CREAT SERPL: 20 (ref 12–20)
CALCIUM SERPL-MCNC: 8.4 MG/DL (ref 8.5–10.1)
CALCIUM SERPL-MCNC: 8.6 MG/DL (ref 8.5–10.1)
CHLORIDE SERPL-SCNC: 101 MMOL/L (ref 97–108)
CHLORIDE SERPL-SCNC: 99 MMOL/L (ref 97–108)
CO2 SERPL-SCNC: 39 MMOL/L (ref 21–32)
CO2 SERPL-SCNC: 41 MMOL/L (ref 21–32)
CREAT SERPL-MCNC: 1.39 MG/DL (ref 0.7–1.3)
CREAT SERPL-MCNC: 1.39 MG/DL (ref 0.7–1.3)
DIFFERENTIAL METHOD BLD: ABNORMAL
EOSINOPHIL # BLD: 0 K/UL (ref 0–0.4)
EOSINOPHIL NFR BLD: 1 % (ref 0–7)
ERYTHROCYTE [DISTWIDTH] IN BLOOD BY AUTOMATED COUNT: 14.2 % (ref 11.5–14.5)
GLOBULIN SER CALC-MCNC: 3.9 G/DL (ref 2–4)
GLUCOSE SERPL-MCNC: 137 MG/DL (ref 65–100)
GLUCOSE SERPL-MCNC: 139 MG/DL (ref 65–100)
HCT VFR BLD AUTO: 34.3 % (ref 36.6–50.3)
HGB BLD-MCNC: 10 G/DL (ref 12.1–17)
IMM GRANULOCYTES # BLD AUTO: 0 K/UL (ref 0–0.04)
IMM GRANULOCYTES NFR BLD AUTO: 0 % (ref 0–0.5)
INR PPP: 2.7 (ref 0.9–1.1)
LYMPHOCYTES # BLD: 1 K/UL (ref 0.8–3.5)
LYMPHOCYTES NFR BLD: 23 % (ref 12–49)
MCH RBC QN AUTO: 29.3 PG (ref 26–34)
MCHC RBC AUTO-ENTMCNC: 29.2 G/DL (ref 30–36.5)
MCV RBC AUTO: 100.6 FL (ref 80–99)
MONOCYTES # BLD: 0.9 K/UL (ref 0–1)
MONOCYTES NFR BLD: 19 % (ref 5–13)
NEUTS SEG # BLD: 2.6 K/UL (ref 1.8–8)
NEUTS SEG NFR BLD: 57 % (ref 32–75)
NRBC # BLD: 0 K/UL (ref 0–0.01)
NRBC BLD-RTO: 0 PER 100 WBC
PLATELET # BLD AUTO: 218 K/UL (ref 150–400)
PMV BLD AUTO: 9.3 FL (ref 8.9–12.9)
POTASSIUM SERPL-SCNC: 4.1 MMOL/L (ref 3.5–5.1)
POTASSIUM SERPL-SCNC: 4.1 MMOL/L (ref 3.5–5.1)
PROT SERPL-MCNC: 6.5 G/DL (ref 6.4–8.2)
PROTHROMBIN TIME: 26.3 SEC (ref 9–11.1)
RBC # BLD AUTO: 3.41 M/UL (ref 4.1–5.7)
SODIUM SERPL-SCNC: 140 MMOL/L (ref 136–145)
SODIUM SERPL-SCNC: 140 MMOL/L (ref 136–145)
WBC # BLD AUTO: 4.6 K/UL (ref 4.1–11.1)

## 2024-10-24 PROCEDURE — 94761 N-INVAS EAR/PLS OXIMETRY MLT: CPT

## 2024-10-24 PROCEDURE — 6360000002 HC RX W HCPCS: Performed by: STUDENT IN AN ORGANIZED HEALTH CARE EDUCATION/TRAINING PROGRAM

## 2024-10-24 PROCEDURE — 1100000000 HC RM PRIVATE

## 2024-10-24 PROCEDURE — 2700000000 HC OXYGEN THERAPY PER DAY

## 2024-10-24 PROCEDURE — 85025 COMPLETE CBC W/AUTO DIFF WBC: CPT

## 2024-10-24 PROCEDURE — APPSS30 APP SPLIT SHARED TIME 16-30 MINUTES: Performed by: NURSE PRACTITIONER

## 2024-10-24 PROCEDURE — 6370000000 HC RX 637 (ALT 250 FOR IP): Performed by: HOSPITALIST

## 2024-10-24 PROCEDURE — 94640 AIRWAY INHALATION TREATMENT: CPT

## 2024-10-24 PROCEDURE — 36415 COLL VENOUS BLD VENIPUNCTURE: CPT

## 2024-10-24 PROCEDURE — 6370000000 HC RX 637 (ALT 250 FOR IP): Performed by: STUDENT IN AN ORGANIZED HEALTH CARE EDUCATION/TRAINING PROGRAM

## 2024-10-24 PROCEDURE — 80053 COMPREHEN METABOLIC PANEL: CPT

## 2024-10-24 PROCEDURE — 85610 PROTHROMBIN TIME: CPT

## 2024-10-24 PROCEDURE — 6360000002 HC RX W HCPCS: Performed by: HOSPITALIST

## 2024-10-24 RX ORDER — POLYETHYLENE GLYCOL 3350 17 G/17G
17 POWDER, FOR SOLUTION ORAL DAILY
Status: DISCONTINUED | OUTPATIENT
Start: 2024-10-24 | End: 2024-10-28 | Stop reason: HOSPADM

## 2024-10-24 RX ORDER — ARFORMOTEROL TARTRATE 15 UG/2ML
15 SOLUTION RESPIRATORY (INHALATION)
Status: DISCONTINUED | OUTPATIENT
Start: 2024-10-24 | End: 2024-10-28 | Stop reason: HOSPADM

## 2024-10-24 RX ORDER — BUDESONIDE 0.5 MG/2ML
0.5 INHALANT ORAL
Status: DISCONTINUED | OUTPATIENT
Start: 2024-10-24 | End: 2024-10-28 | Stop reason: HOSPADM

## 2024-10-24 RX ORDER — WARFARIN SODIUM 1 MG/1
1 TABLET ORAL
Status: COMPLETED | OUTPATIENT
Start: 2024-10-24 | End: 2024-10-24

## 2024-10-24 RX ADMIN — BUDESONIDE 500 MCG: 0.5 SUSPENSION RESPIRATORY (INHALATION) at 18:12

## 2024-10-24 RX ADMIN — WARFARIN SODIUM 1 MG: 1 TABLET ORAL at 18:54

## 2024-10-24 RX ADMIN — FUROSEMIDE 40 MG: 10 INJECTION, SOLUTION INTRAMUSCULAR; INTRAVENOUS at 08:26

## 2024-10-24 RX ADMIN — IRON SUCROSE 200 MG: 20 INJECTION, SOLUTION INTRAVENOUS at 17:13

## 2024-10-24 RX ADMIN — POTASSIUM CHLORIDE 20 MEQ: 750 TABLET, EXTENDED RELEASE ORAL at 08:26

## 2024-10-24 RX ADMIN — ARFORMOTEROL TARTRATE 15 MCG: 15 SOLUTION RESPIRATORY (INHALATION) at 18:12

## 2024-10-24 RX ADMIN — IPRATROPIUM BROMIDE AND ALBUTEROL SULFATE 1 DOSE: 2.5; .5 SOLUTION RESPIRATORY (INHALATION) at 16:36

## 2024-10-24 RX ADMIN — POLYETHYLENE GLYCOL 3350 17 G: 17 POWDER, FOR SOLUTION ORAL at 17:13

## 2024-10-24 RX ADMIN — METOPROLOL SUCCINATE 50 MG: 50 TABLET, EXTENDED RELEASE ORAL at 08:25

## 2024-10-24 ASSESSMENT — PAIN SCALES - GENERAL
PAINLEVEL_OUTOF10: 0
PAINLEVEL_OUTOF10: 0

## 2024-10-24 NOTE — WOUND CARE
Wound Care Note:     New consult for \"leg wound\"   Seen in A368/01     89 y.o. y/o male admitted on 10/22/2024   Admitted for Shortness of breath [R06.02]  Chronic congestive heart failure, unspecified heart failure type (HCC) [I50.9]   No past medical history on file.  WBC = 4.6  No indication for wound culture  Diet: ADULT DIET; Regular; Low Fat/Low Chol/High Fiber/JOSE; Low Sodium (2 gm)           Assessment:   Patient is alert, cooperative and reports no pain.   Requires 1 assist to reposition for assessment.   Surface: Donald bed with SELAM mattress    1. POA Left lower leg  Scattered areas on anterior side  Moist, red, purulent drainage beneath layer of dried exudate, no pain, no odor with surrounding blanchable erythema  Tx: Cleansed with Vashe, applied Xeroform, covered with 4x4s and wrapped with rolled gauze.     Recommendations:    Left lower leg Cleanse with Vashe, apply Xeroform, cover with 4x4s and wrap with rolled gauze. Change daily.     Turn/reposition approximately every 2 hours  Offload heels with heels hanging off end of pillow at all times while in bed.  Sacral Foam dressing: lift to assess regularly; change as needed. Discontinue if incontinence is frequently soiling dressing.     Z-guard cream to buttocks and sacrum daily and as needed with incontinence care  Low Air Loss mattress: Use only flat sheet and one incontinence pad.     No concerns to relay to provider.    Transition of Care: Plan to follow weekly and as needed while admitted to hospital.    Tanisha Schroeder RN  [facility] Inpatient Wound Care Department  Office [phone]  Available via [employer]

## 2024-10-25 LAB
ANION GAP SERPL CALC-SCNC: ABNORMAL MMOL/L (ref 2–12)
BUN SERPL-MCNC: 27 MG/DL (ref 6–20)
BUN/CREAT SERPL: 22 (ref 12–20)
CALCIUM SERPL-MCNC: 8.7 MG/DL (ref 8.5–10.1)
CHLORIDE SERPL-SCNC: 100 MMOL/L (ref 97–108)
CO2 SERPL-SCNC: 41 MMOL/L (ref 21–32)
CREAT SERPL-MCNC: 1.21 MG/DL (ref 0.7–1.3)
GLUCOSE SERPL-MCNC: 126 MG/DL (ref 65–100)
INR PPP: 2.6 (ref 0.9–1.1)
POTASSIUM SERPL-SCNC: 4.4 MMOL/L (ref 3.5–5.1)
PROTHROMBIN TIME: 25 SEC (ref 9–11.1)
SODIUM SERPL-SCNC: 140 MMOL/L (ref 136–145)

## 2024-10-25 PROCEDURE — 94640 AIRWAY INHALATION TREATMENT: CPT

## 2024-10-25 PROCEDURE — APPSS45 APP SPLIT SHARED TIME 31-45 MINUTES: Performed by: NURSE PRACTITIONER

## 2024-10-25 PROCEDURE — 1100000000 HC RM PRIVATE

## 2024-10-25 PROCEDURE — 6360000002 HC RX W HCPCS: Performed by: HOSPITALIST

## 2024-10-25 PROCEDURE — 6370000000 HC RX 637 (ALT 250 FOR IP): Performed by: HOSPITALIST

## 2024-10-25 PROCEDURE — 85610 PROTHROMBIN TIME: CPT

## 2024-10-25 PROCEDURE — 2700000000 HC OXYGEN THERAPY PER DAY

## 2024-10-25 PROCEDURE — 6370000000 HC RX 637 (ALT 250 FOR IP): Performed by: STUDENT IN AN ORGANIZED HEALTH CARE EDUCATION/TRAINING PROGRAM

## 2024-10-25 PROCEDURE — 36415 COLL VENOUS BLD VENIPUNCTURE: CPT

## 2024-10-25 PROCEDURE — 80048 BASIC METABOLIC PNL TOTAL CA: CPT

## 2024-10-25 PROCEDURE — 99222 1ST HOSP IP/OBS MODERATE 55: CPT | Performed by: STUDENT IN AN ORGANIZED HEALTH CARE EDUCATION/TRAINING PROGRAM

## 2024-10-25 PROCEDURE — 6360000002 HC RX W HCPCS: Performed by: STUDENT IN AN ORGANIZED HEALTH CARE EDUCATION/TRAINING PROGRAM

## 2024-10-25 PROCEDURE — 94761 N-INVAS EAR/PLS OXIMETRY MLT: CPT

## 2024-10-25 RX ORDER — WARFARIN SODIUM 1 MG/1
2 TABLET ORAL
Status: COMPLETED | OUTPATIENT
Start: 2024-10-25 | End: 2024-10-25

## 2024-10-25 RX ORDER — ONDANSETRON 2 MG/ML
4 INJECTION INTRAMUSCULAR; INTRAVENOUS EVERY 6 HOURS PRN
Status: DISCONTINUED | OUTPATIENT
Start: 2024-10-25 | End: 2024-10-28 | Stop reason: HOSPADM

## 2024-10-25 RX ADMIN — BUDESONIDE 500 MCG: 0.5 SUSPENSION RESPIRATORY (INHALATION) at 07:35

## 2024-10-25 RX ADMIN — FUROSEMIDE 40 MG: 10 INJECTION, SOLUTION INTRAMUSCULAR; INTRAVENOUS at 09:27

## 2024-10-25 RX ADMIN — POLYETHYLENE GLYCOL 3350 17 G: 17 POWDER, FOR SOLUTION ORAL at 09:25

## 2024-10-25 RX ADMIN — ARFORMOTEROL TARTRATE 15 MCG: 15 SOLUTION RESPIRATORY (INHALATION) at 19:46

## 2024-10-25 RX ADMIN — FUROSEMIDE 40 MG: 10 INJECTION, SOLUTION INTRAMUSCULAR; INTRAVENOUS at 17:48

## 2024-10-25 RX ADMIN — POTASSIUM CHLORIDE 20 MEQ: 750 TABLET, EXTENDED RELEASE ORAL at 09:27

## 2024-10-25 RX ADMIN — METOPROLOL SUCCINATE 50 MG: 50 TABLET, EXTENDED RELEASE ORAL at 09:26

## 2024-10-25 RX ADMIN — WARFARIN SODIUM 2 MG: 1 TABLET ORAL at 17:48

## 2024-10-25 RX ADMIN — IRON SUCROSE 200 MG: 20 INJECTION, SOLUTION INTRAVENOUS at 09:25

## 2024-10-25 RX ADMIN — BUDESONIDE 500 MCG: 0.5 SUSPENSION RESPIRATORY (INHALATION) at 19:46

## 2024-10-25 RX ADMIN — ARFORMOTEROL TARTRATE 15 MCG: 15 SOLUTION RESPIRATORY (INHALATION) at 07:35

## 2024-10-25 ASSESSMENT — PAIN SCALES - GENERAL: PAINLEVEL_OUTOF10: 0

## 2024-10-25 NOTE — CONSULTS
Brief Palliative Note    Consult received for end stage disease for CHF.  Appears he has voiced interest in Hospice to Cardiology team and Hospice services also consulted.     For now, will defer to Hospice as this seems to be what he is interested in and will be more robust support in his home ([facility]) environment.  Happy to [place] back if goals unclear after he meets with Hospice team.    Thank you for considering Palliative team in the care of Timur Ley    
Duplicate   
denies any focal symptomatic complaints, specifically pain, nausea, constipation.  Appears a bit short of breath with prolonged speech but denies dyspnea when asked.  Surrogate  No AMD on file. Wife is legal NOK.  Code Status  DNR.  Initial consult note routed to primary continuity provider and/or primary health care team members  Please call with any palliative questions or concerns.  Palliative Care Team is available via perfect serve or via phone.    Referrals to:   [] Outpatient Palliative Care  [] Home Based Palliative Care  [] Home Based Primary Care  [x] Hospice     ADVANCE CARE PLANNING:   [] The [facility] Interdisciplinary Team has updated the ACP Navigator with Health Care Decision Maker and Patient Capacity      Confirm Advance Directive: None    Current Code Status: DNR     Goals of Care: Goals of Care and Interventions  Patient/Health Care Proxy Stated Goals:  (home with Hospice)  Medical Interventions: Limited additional interventions       Please refer to Palliative Medicine ACP notes for further details.    PALLIATIVE ASSESSMENT:      Palliative Performance Scale (PPS):  PPS: 60    ECOG:        Modified ESAS:  Modified-Folcroft Symptom Assessment Scale (ESAS)  Pain Score: No pain  Nausea Score: Not nauseated  Dyspnea Score: No shortness of breath    Clinical Pain Assessment (nonverbal scale for severity on nonverbal patients):   Clinical Pain Assessment  Severity: 0       NVPS:       RDOS:         Vital Signs: Blood pressure 104/61, pulse 70, temperature 97.9 °F (36.6 °C), temperature source Oral, resp. rate 16, height 1.803 m (5' 11\"), weight 81.2 kg (179 lb), SpO2 99%.    PHYSICAL ASSESSMENT:   General: [x] Oriented x3  [x] Well appearing  [] Intubated  []Ill appearing  []Other:  Mental Status: [x] Normal mental status exam  [] Drowsy  [] Confused  []Other:  Cardiovascular: [x] Regular rate  [] Arrhythmia  [] Other:  Chest: [] Effort normal  []Lungs clear  [] Respiratory distress  []Tachypnea  [x]

## 2024-10-26 LAB
ANION GAP SERPL CALC-SCNC: 2 MMOL/L (ref 2–12)
BUN SERPL-MCNC: 24 MG/DL (ref 6–20)
BUN/CREAT SERPL: 20 (ref 12–20)
CALCIUM SERPL-MCNC: 8.5 MG/DL (ref 8.5–10.1)
CHLORIDE SERPL-SCNC: 98 MMOL/L (ref 97–108)
CO2 SERPL-SCNC: 40 MMOL/L (ref 21–32)
CREAT SERPL-MCNC: 1.19 MG/DL (ref 0.7–1.3)
FERRITIN SERPL-MCNC: 264 NG/ML (ref 26–388)
FOLATE SERPL-MCNC: 4 NG/ML (ref 5–21)
GLUCOSE SERPL-MCNC: 209 MG/DL (ref 65–100)
INR PPP: 2.7 (ref 0.9–1.1)
IRON SATN MFR SERPL: 95 % (ref 20–50)
IRON SERPL-MCNC: 295 UG/DL (ref 35–150)
POTASSIUM SERPL-SCNC: 4.4 MMOL/L (ref 3.5–5.1)
PROTHROMBIN TIME: 26.7 SEC (ref 9–11.1)
SODIUM SERPL-SCNC: 140 MMOL/L (ref 136–145)
TIBC SERPL-MCNC: 309 UG/DL (ref 250–450)
VIT B12 SERPL-MCNC: 419 PG/ML (ref 193–986)

## 2024-10-26 PROCEDURE — 1100000000 HC RM PRIVATE

## 2024-10-26 PROCEDURE — 82746 ASSAY OF FOLIC ACID SERUM: CPT

## 2024-10-26 PROCEDURE — 6370000000 HC RX 637 (ALT 250 FOR IP): Performed by: INTERNAL MEDICINE

## 2024-10-26 PROCEDURE — 6360000002 HC RX W HCPCS: Performed by: HOSPITALIST

## 2024-10-26 PROCEDURE — 94640 AIRWAY INHALATION TREATMENT: CPT

## 2024-10-26 PROCEDURE — 80048 BASIC METABOLIC PNL TOTAL CA: CPT

## 2024-10-26 PROCEDURE — 83540 ASSAY OF IRON: CPT

## 2024-10-26 PROCEDURE — 83550 IRON BINDING TEST: CPT

## 2024-10-26 PROCEDURE — 94664 DEMO&/EVAL PT USE INHALER: CPT

## 2024-10-26 PROCEDURE — 6370000000 HC RX 637 (ALT 250 FOR IP): Performed by: HOSPITALIST

## 2024-10-26 PROCEDURE — 6370000000 HC RX 637 (ALT 250 FOR IP): Performed by: STUDENT IN AN ORGANIZED HEALTH CARE EDUCATION/TRAINING PROGRAM

## 2024-10-26 PROCEDURE — 82728 ASSAY OF FERRITIN: CPT

## 2024-10-26 PROCEDURE — 6360000002 HC RX W HCPCS: Performed by: STUDENT IN AN ORGANIZED HEALTH CARE EDUCATION/TRAINING PROGRAM

## 2024-10-26 PROCEDURE — 6360000002 HC RX W HCPCS: Performed by: INTERNAL MEDICINE

## 2024-10-26 PROCEDURE — 36415 COLL VENOUS BLD VENIPUNCTURE: CPT

## 2024-10-26 PROCEDURE — 94761 N-INVAS EAR/PLS OXIMETRY MLT: CPT

## 2024-10-26 PROCEDURE — 2700000000 HC OXYGEN THERAPY PER DAY

## 2024-10-26 PROCEDURE — 85610 PROTHROMBIN TIME: CPT

## 2024-10-26 PROCEDURE — 82607 VITAMIN B-12: CPT

## 2024-10-26 RX ORDER — FOLIC ACID 1 MG/1
1 TABLET ORAL DAILY
Status: DISCONTINUED | OUTPATIENT
Start: 2024-10-27 | End: 2024-10-28 | Stop reason: HOSPADM

## 2024-10-26 RX ORDER — PREDNISONE 20 MG/1
40 TABLET ORAL DAILY
Status: DISCONTINUED | OUTPATIENT
Start: 2024-10-26 | End: 2024-10-27

## 2024-10-26 RX ORDER — LEVALBUTEROL INHALATION SOLUTION 1.25 MG/3ML
1.25 SOLUTION RESPIRATORY (INHALATION)
Status: DISCONTINUED | OUTPATIENT
Start: 2024-10-26 | End: 2024-10-27

## 2024-10-26 RX ORDER — PANTOPRAZOLE SODIUM 40 MG/1
40 TABLET, DELAYED RELEASE ORAL
Status: DISCONTINUED | OUTPATIENT
Start: 2024-10-26 | End: 2024-10-28 | Stop reason: HOSPADM

## 2024-10-26 RX ORDER — WARFARIN SODIUM 1 MG/1
1 TABLET ORAL
Status: COMPLETED | OUTPATIENT
Start: 2024-10-26 | End: 2024-10-26

## 2024-10-26 RX ORDER — PANTOPRAZOLE SODIUM 40 MG/1
40 TABLET, DELAYED RELEASE ORAL
Status: DISCONTINUED | OUTPATIENT
Start: 2024-10-27 | End: 2024-10-26

## 2024-10-26 RX ADMIN — FUROSEMIDE 40 MG: 10 INJECTION, SOLUTION INTRAMUSCULAR; INTRAVENOUS at 08:58

## 2024-10-26 RX ADMIN — FUROSEMIDE 40 MG: 10 INJECTION, SOLUTION INTRAMUSCULAR; INTRAVENOUS at 18:03

## 2024-10-26 RX ADMIN — ARFORMOTEROL TARTRATE 15 MCG: 15 SOLUTION RESPIRATORY (INHALATION) at 19:26

## 2024-10-26 RX ADMIN — LEVALBUTEROL HYDROCHLORIDE 1.25 MG: 1.25 SOLUTION RESPIRATORY (INHALATION) at 19:28

## 2024-10-26 RX ADMIN — PREDNISONE 40 MG: 20 TABLET ORAL at 14:13

## 2024-10-26 RX ADMIN — LEVALBUTEROL HYDROCHLORIDE 1.25 MG: 1.25 SOLUTION RESPIRATORY (INHALATION) at 15:33

## 2024-10-26 RX ADMIN — BUDESONIDE 500 MCG: 0.5 SUSPENSION RESPIRATORY (INHALATION) at 08:33

## 2024-10-26 RX ADMIN — IPRATROPIUM BROMIDE 0.5 MG: 0.5 SOLUTION RESPIRATORY (INHALATION) at 19:28

## 2024-10-26 RX ADMIN — POTASSIUM CHLORIDE 20 MEQ: 750 TABLET, EXTENDED RELEASE ORAL at 08:22

## 2024-10-26 RX ADMIN — ARFORMOTEROL TARTRATE 15 MCG: 15 SOLUTION RESPIRATORY (INHALATION) at 08:33

## 2024-10-26 RX ADMIN — BUDESONIDE 500 MCG: 0.5 SUSPENSION RESPIRATORY (INHALATION) at 19:26

## 2024-10-26 RX ADMIN — PANTOPRAZOLE SODIUM 40 MG: 40 TABLET, DELAYED RELEASE ORAL at 14:13

## 2024-10-26 RX ADMIN — POLYETHYLENE GLYCOL 3350 17 G: 17 POWDER, FOR SOLUTION ORAL at 08:22

## 2024-10-26 RX ADMIN — WARFARIN SODIUM 1 MG: 1 TABLET ORAL at 18:03

## 2024-10-26 RX ADMIN — IPRATROPIUM BROMIDE 0.5 MG: 0.5 SOLUTION RESPIRATORY (INHALATION) at 15:33

## 2024-10-26 ASSESSMENT — PAIN SCALES - GENERAL
PAINLEVEL_OUTOF10: 0
PAINLEVEL_OUTOF10: 0

## 2024-10-26 NOTE — PLAN OF CARE
Problem: Discharge Planning  Goal: Discharge to home or other facility with appropriate resources  Outcome: Progressing     Problem: Pain  Goal: Verbalizes/displays adequate comfort level or baseline comfort level  Outcome: Progressing     Problem: ABCDS Injury Assessment  Goal: Absence of physical injury  Outcome: Progressing     Problem: Safety - Adult  Goal: Free from fall injury  Outcome: Progressing     Problem: Chronic Conditions and Co-morbidities  Goal: Patient's chronic conditions and co-morbidity symptoms are monitored and maintained or improved  Outcome: Progressing     Problem: Respiratory - Adult  Goal: Achieves optimal ventilation and oxygenation  Outcome: Progressing

## 2024-10-27 ENCOUNTER — APPOINTMENT (OUTPATIENT)
Facility: HOSPITAL | Age: 89
DRG: 291 | End: 2024-10-27
Payer: MEDICARE

## 2024-10-27 LAB
ANION GAP SERPL CALC-SCNC: 1 MMOL/L (ref 2–12)
BUN SERPL-MCNC: 29 MG/DL (ref 6–20)
BUN/CREAT SERPL: 21 (ref 12–20)
CALCIUM SERPL-MCNC: 8.8 MG/DL (ref 8.5–10.1)
CHLORIDE SERPL-SCNC: 97 MMOL/L (ref 97–108)
CO2 SERPL-SCNC: 39 MMOL/L (ref 21–32)
CREAT SERPL-MCNC: 1.36 MG/DL (ref 0.7–1.3)
EST. AVERAGE GLUCOSE BLD GHB EST-MCNC: 137 MG/DL
GLUCOSE SERPL-MCNC: 222 MG/DL (ref 65–100)
HBA1C MFR BLD: 6.4 % (ref 4–5.6)
INR PPP: 3 (ref 0.9–1.1)
NT PRO BNP: 1048 PG/ML
POTASSIUM SERPL-SCNC: 4.7 MMOL/L (ref 3.5–5.1)
PROTHROMBIN TIME: 29 SEC (ref 9–11.1)
SODIUM SERPL-SCNC: 137 MMOL/L (ref 136–145)

## 2024-10-27 PROCEDURE — 94640 AIRWAY INHALATION TREATMENT: CPT

## 2024-10-27 PROCEDURE — 94761 N-INVAS EAR/PLS OXIMETRY MLT: CPT

## 2024-10-27 PROCEDURE — 2700000000 HC OXYGEN THERAPY PER DAY

## 2024-10-27 PROCEDURE — 6360000002 HC RX W HCPCS: Performed by: INTERNAL MEDICINE

## 2024-10-27 PROCEDURE — 83036 HEMOGLOBIN GLYCOSYLATED A1C: CPT

## 2024-10-27 PROCEDURE — 36415 COLL VENOUS BLD VENIPUNCTURE: CPT

## 2024-10-27 PROCEDURE — 6370000000 HC RX 637 (ALT 250 FOR IP): Performed by: INTERNAL MEDICINE

## 2024-10-27 PROCEDURE — 71045 X-RAY EXAM CHEST 1 VIEW: CPT

## 2024-10-27 PROCEDURE — 6360000002 HC RX W HCPCS: Performed by: STUDENT IN AN ORGANIZED HEALTH CARE EDUCATION/TRAINING PROGRAM

## 2024-10-27 PROCEDURE — 1100000000 HC RM PRIVATE

## 2024-10-27 PROCEDURE — 83880 ASSAY OF NATRIURETIC PEPTIDE: CPT

## 2024-10-27 PROCEDURE — 2500000003 HC RX 250 WO HCPCS: Performed by: INTERNAL MEDICINE

## 2024-10-27 PROCEDURE — 80048 BASIC METABOLIC PNL TOTAL CA: CPT

## 2024-10-27 PROCEDURE — 2580000003 HC RX 258: Performed by: INTERNAL MEDICINE

## 2024-10-27 PROCEDURE — 85610 PROTHROMBIN TIME: CPT

## 2024-10-27 RX ORDER — LEVALBUTEROL INHALATION SOLUTION 1.25 MG/3ML
1.25 SOLUTION RESPIRATORY (INHALATION)
Status: DISCONTINUED | OUTPATIENT
Start: 2024-10-27 | End: 2024-10-28 | Stop reason: HOSPADM

## 2024-10-27 RX ORDER — FUROSEMIDE 40 MG/1
40 TABLET ORAL 2 TIMES DAILY
Status: DISCONTINUED | OUTPATIENT
Start: 2024-10-27 | End: 2024-10-28 | Stop reason: HOSPADM

## 2024-10-27 RX ORDER — WARFARIN SODIUM 1 MG/1
1 TABLET ORAL
Status: COMPLETED | OUTPATIENT
Start: 2024-10-27 | End: 2024-10-27

## 2024-10-27 RX ORDER — FUROSEMIDE 10 MG/ML
40 INJECTION INTRAMUSCULAR; INTRAVENOUS 2 TIMES DAILY
Status: DISCONTINUED | OUTPATIENT
Start: 2024-10-28 | End: 2024-10-27

## 2024-10-27 RX ORDER — METOPROLOL SUCCINATE 25 MG/1
25 TABLET, EXTENDED RELEASE ORAL DAILY
Status: DISCONTINUED | OUTPATIENT
Start: 2024-10-27 | End: 2024-10-28 | Stop reason: HOSPADM

## 2024-10-27 RX ADMIN — IPRATROPIUM BROMIDE 0.5 MG: 0.5 SOLUTION RESPIRATORY (INHALATION) at 07:00

## 2024-10-27 RX ADMIN — METHYLPREDNISOLONE SODIUM SUCCINATE 40 MG: 40 INJECTION INTRAMUSCULAR; INTRAVENOUS at 13:49

## 2024-10-27 RX ADMIN — IPRATROPIUM BROMIDE 0.5 MG: 0.5 SOLUTION RESPIRATORY (INHALATION) at 19:50

## 2024-10-27 RX ADMIN — BUDESONIDE 500 MCG: 0.5 SUSPENSION RESPIRATORY (INHALATION) at 07:07

## 2024-10-27 RX ADMIN — DOXYCYCLINE 100 MG: 100 INJECTION, POWDER, LYOPHILIZED, FOR SOLUTION INTRAVENOUS at 11:12

## 2024-10-27 RX ADMIN — LEVALBUTEROL HYDROCHLORIDE 1.25 MG: 1.25 SOLUTION RESPIRATORY (INHALATION) at 07:00

## 2024-10-27 RX ADMIN — PANTOPRAZOLE SODIUM 40 MG: 40 TABLET, DELAYED RELEASE ORAL at 07:50

## 2024-10-27 RX ADMIN — ARFORMOTEROL TARTRATE 15 MCG: 15 SOLUTION RESPIRATORY (INHALATION) at 19:50

## 2024-10-27 RX ADMIN — IPRATROPIUM BROMIDE 0.5 MG: 0.5 SOLUTION RESPIRATORY (INHALATION) at 12:42

## 2024-10-27 RX ADMIN — LEVALBUTEROL HYDROCHLORIDE 1.25 MG: 1.25 SOLUTION RESPIRATORY (INHALATION) at 19:42

## 2024-10-27 RX ADMIN — FUROSEMIDE 40 MG: 40 TABLET ORAL at 08:50

## 2024-10-27 RX ADMIN — LEVALBUTEROL HYDROCHLORIDE 1.25 MG: 1.25 SOLUTION RESPIRATORY (INHALATION) at 12:42

## 2024-10-27 RX ADMIN — METOPROLOL SUCCINATE 25 MG: 25 TABLET, EXTENDED RELEASE ORAL at 08:51

## 2024-10-27 RX ADMIN — FOLIC ACID 1 MG: 1 TABLET ORAL at 08:50

## 2024-10-27 RX ADMIN — BUDESONIDE 500 MCG: 0.5 SUSPENSION RESPIRATORY (INHALATION) at 19:50

## 2024-10-27 RX ADMIN — PREDNISONE 40 MG: 20 TABLET ORAL at 08:51

## 2024-10-27 RX ADMIN — ARFORMOTEROL TARTRATE 15 MCG: 15 SOLUTION RESPIRATORY (INHALATION) at 07:07

## 2024-10-27 RX ADMIN — WARFARIN SODIUM 1 MG: 1 TABLET ORAL at 17:30

## 2024-10-27 RX ADMIN — FUROSEMIDE 40 MG: 40 TABLET ORAL at 17:21

## 2024-10-27 ASSESSMENT — PAIN SCALES - GENERAL
PAINLEVEL_OUTOF10: 0

## 2024-10-27 NOTE — PLAN OF CARE
Problem: Discharge Planning  Goal: Discharge to home or other facility with appropriate resources  Outcome: Progressing     Problem: Pain  Goal: Verbalizes/displays adequate comfort level or baseline comfort level  Outcome: Progressing     Problem: ABCDS Injury Assessment  Goal: Absence of physical injury  Outcome: Progressing     Problem: Safety - Adult  Goal: Free from fall injury  Outcome: Progressing     Problem: Chronic Conditions and Co-morbidities  Goal: Patient's chronic conditions and co-morbidity symptoms are monitored and maintained or improved  Outcome: Progressing     Problem: Respiratory - Adult  Goal: Achieves optimal ventilation and oxygenation  10/27/2024 1717 by Leslie Fritz, RN  Outcome: Progressing  10/27/2024 0704 by Eden Lanza, RT  Outcome: Progressing

## 2024-10-28 VITALS
RESPIRATION RATE: 19 BRPM | WEIGHT: 169.6 LBS | HEIGHT: 71 IN | HEART RATE: 68 BPM | TEMPERATURE: 97.7 F | OXYGEN SATURATION: 96 % | DIASTOLIC BLOOD PRESSURE: 59 MMHG | BODY MASS INDEX: 23.74 KG/M2 | SYSTOLIC BLOOD PRESSURE: 112 MMHG

## 2024-10-28 LAB
ANION GAP SERPL CALC-SCNC: 2 MMOL/L (ref 2–12)
BASOPHILS # BLD: 0 K/UL (ref 0–0.1)
BASOPHILS NFR BLD: 0 % (ref 0–1)
BUN SERPL-MCNC: 34 MG/DL (ref 6–20)
BUN/CREAT SERPL: 22 (ref 12–20)
CALCIUM SERPL-MCNC: 8.8 MG/DL (ref 8.5–10.1)
CHLORIDE SERPL-SCNC: 97 MMOL/L (ref 97–108)
CO2 SERPL-SCNC: 38 MMOL/L (ref 21–32)
CREAT SERPL-MCNC: 1.57 MG/DL (ref 0.7–1.3)
DIFFERENTIAL METHOD BLD: ABNORMAL
EOSINOPHIL # BLD: 0 K/UL (ref 0–0.4)
EOSINOPHIL NFR BLD: 0 % (ref 0–7)
ERYTHROCYTE [DISTWIDTH] IN BLOOD BY AUTOMATED COUNT: 14.6 % (ref 11.5–14.5)
GLUCOSE SERPL-MCNC: 354 MG/DL (ref 65–100)
HCT VFR BLD AUTO: 31.1 % (ref 36.6–50.3)
HGB BLD-MCNC: 9.5 G/DL (ref 12.1–17)
IMM GRANULOCYTES # BLD AUTO: 0.1 K/UL (ref 0–0.04)
IMM GRANULOCYTES NFR BLD AUTO: 1 % (ref 0–0.5)
INR PPP: 3.7 (ref 0.9–1.1)
LYMPHOCYTES # BLD: 0.4 K/UL (ref 0.8–3.5)
LYMPHOCYTES NFR BLD: 6 % (ref 12–49)
MCH RBC QN AUTO: 30 PG (ref 26–34)
MCHC RBC AUTO-ENTMCNC: 30.5 G/DL (ref 30–36.5)
MCV RBC AUTO: 98.1 FL (ref 80–99)
MONOCYTES # BLD: 0.4 K/UL (ref 0–1)
MONOCYTES NFR BLD: 7 % (ref 5–13)
NEUTS SEG # BLD: 5.1 K/UL (ref 1.8–8)
NEUTS SEG NFR BLD: 86 % (ref 32–75)
NRBC # BLD: 0 K/UL (ref 0–0.01)
NRBC BLD-RTO: 0 PER 100 WBC
PLATELET # BLD AUTO: 194 K/UL (ref 150–400)
PMV BLD AUTO: 10.1 FL (ref 8.9–12.9)
POTASSIUM SERPL-SCNC: 4.3 MMOL/L (ref 3.5–5.1)
PROTHROMBIN TIME: 35.5 SEC (ref 9–11.1)
RBC # BLD AUTO: 3.17 M/UL (ref 4.1–5.7)
RBC MORPH BLD: ABNORMAL
SODIUM SERPL-SCNC: 137 MMOL/L (ref 136–145)
WBC # BLD AUTO: 6 K/UL (ref 4.1–11.1)

## 2024-10-28 PROCEDURE — 6360000002 HC RX W HCPCS: Performed by: INTERNAL MEDICINE

## 2024-10-28 PROCEDURE — 94761 N-INVAS EAR/PLS OXIMETRY MLT: CPT

## 2024-10-28 PROCEDURE — 2580000003 HC RX 258: Performed by: INTERNAL MEDICINE

## 2024-10-28 PROCEDURE — 6360000002 HC RX W HCPCS: Performed by: STUDENT IN AN ORGANIZED HEALTH CARE EDUCATION/TRAINING PROGRAM

## 2024-10-28 PROCEDURE — 80048 BASIC METABOLIC PNL TOTAL CA: CPT

## 2024-10-28 PROCEDURE — 36415 COLL VENOUS BLD VENIPUNCTURE: CPT

## 2024-10-28 PROCEDURE — 6370000000 HC RX 637 (ALT 250 FOR IP): Performed by: INTERNAL MEDICINE

## 2024-10-28 PROCEDURE — 85610 PROTHROMBIN TIME: CPT

## 2024-10-28 PROCEDURE — 94640 AIRWAY INHALATION TREATMENT: CPT

## 2024-10-28 PROCEDURE — 85025 COMPLETE CBC W/AUTO DIFF WBC: CPT

## 2024-10-28 PROCEDURE — 2700000000 HC OXYGEN THERAPY PER DAY

## 2024-10-28 PROCEDURE — 2500000003 HC RX 250 WO HCPCS: Performed by: INTERNAL MEDICINE

## 2024-10-28 RX ORDER — FUROSEMIDE 20 MG/1
20 TABLET ORAL 2 TIMES DAILY
Qty: 60 TABLET | Refills: 0 | Status: SHIPPED
Start: 2024-10-28 | End: 2024-11-27

## 2024-10-28 RX ORDER — METHYLPREDNISOLONE 4 MG/1
TABLET ORAL
Qty: 1 KIT | Refills: 0 | Status: SHIPPED | OUTPATIENT
Start: 2024-10-28 | End: 2024-11-03

## 2024-10-28 RX ORDER — DOXYCYCLINE HYCLATE 100 MG
100 TABLET ORAL 2 TIMES DAILY
Qty: 6 TABLET | Refills: 0 | Status: SHIPPED | OUTPATIENT
Start: 2024-10-28 | End: 2024-10-31

## 2024-10-28 RX ORDER — WARFARIN SODIUM 2 MG/1
1 TABLET ORAL DAILY
Qty: 15 TABLET | Refills: 0 | Status: SHIPPED
Start: 2024-10-28 | End: 2024-11-27

## 2024-10-28 RX ORDER — FOLIC ACID 1 MG/1
1 TABLET ORAL DAILY
Qty: 30 TABLET | Refills: 0 | Status: SHIPPED | OUTPATIENT
Start: 2024-10-29 | End: 2024-11-28

## 2024-10-28 RX ADMIN — BUDESONIDE 500 MCG: 0.5 SUSPENSION RESPIRATORY (INHALATION) at 08:37

## 2024-10-28 RX ADMIN — DOXYCYCLINE 100 MG: 100 INJECTION, POWDER, LYOPHILIZED, FOR SOLUTION INTRAVENOUS at 00:03

## 2024-10-28 RX ADMIN — FUROSEMIDE 40 MG: 40 TABLET ORAL at 09:26

## 2024-10-28 RX ADMIN — METOPROLOL SUCCINATE 25 MG: 25 TABLET, EXTENDED RELEASE ORAL at 09:26

## 2024-10-28 RX ADMIN — FOLIC ACID 1 MG: 1 TABLET ORAL at 09:26

## 2024-10-28 RX ADMIN — ARFORMOTEROL TARTRATE 15 MCG: 15 SOLUTION RESPIRATORY (INHALATION) at 08:37

## 2024-10-28 RX ADMIN — IPRATROPIUM BROMIDE 0.5 MG: 0.5 SOLUTION RESPIRATORY (INHALATION) at 08:31

## 2024-10-28 RX ADMIN — METHYLPREDNISOLONE SODIUM SUCCINATE 40 MG: 40 INJECTION INTRAMUSCULAR; INTRAVENOUS at 00:03

## 2024-10-28 RX ADMIN — LEVALBUTEROL HYDROCHLORIDE 1.25 MG: 1.25 SOLUTION RESPIRATORY (INHALATION) at 08:31

## 2024-10-28 RX ADMIN — PANTOPRAZOLE SODIUM 40 MG: 40 TABLET, DELAYED RELEASE ORAL at 09:26

## 2024-10-28 ASSESSMENT — PAIN SCALES - GENERAL
PAINLEVEL_OUTOF10: 0
PAINLEVEL_OUTOF10: 0

## 2024-10-28 NOTE — PALLIATIVE CARE DISCHARGE
Goals of Care/Treatment Preferences    The Palliative Medicine team was consulted as part of your/your loved one's care in the hospital. Our team is a supportive service; we strive to relieve suffering and improve quality of life.    We reviewed advance care planning information, which includes the following:    Primary Decision Maker: Mrs. Jil - Spouse  Patient's Healthcare Decision Maker is:: Legal Next of Kin  Confirm Advance Directive: Not on file    Patient/Health Care Proxy Stated Goals:  Return home with Hospice support    We reviewed / discussed your code status as:   Code Status: DNR     “Full Code” means perform CPR in the event of cardiac arrest.      “DNR” means do NOT perform CPR in the event of cardiac arrest.      “Partial Code” means you have specific preferences, please discuss with your healthcare team.      “No Order” means this issue was not addressed / resolved during your stay    Medical Interventions: Limited additional interventions       Because of the importance of this information, we are providing you with a printed copy to share with other healthcare providers after this hospitalization is complete.

## 2024-10-28 NOTE — ACP (ADVANCE CARE PLANNING)
Pt does not have AMD on file.  In absence of verified Medical POA, wife is legal NOK and would be surrogate decision maker if pt is unable to speak for himself.  Per 10/25 discussion with Palliative Medicine MD, pt expressed belief that he had already has DDNR in place, declined to complete a new form until reviewing with family.  Pt will be discharged home today with support from [facility].

## 2024-10-28 NOTE — CARE COORDINATION
Care Management Progress Note    Reason for Admission:   Shortness of breath [R06.02]  Chronic congestive heart failure, unspecified heart failure type (HCC) [I50.9]         Patient Admission Status: Inpatient  RUR:   Hospitalization in the last 30 days (Readmission):  No        Transition of care plan:     10/24/24 1116   Condition of Participation: Discharge Planning   The Patient and/or Patient Representative was provided with a Choice of Provider? Patient   The Patient and/Or Patient Representative agree with the Discharge Plan? Yes   Freedom of Choice list was provided with basic dialogue that supports the patient's individualized plan of care/goals, treatment preferences, and shares the quality data associated with the providers?  Yes     Patient with Hospice order. CM met with patient, spouse and family at bedside. Discussed hospice at length. [facility] at patient's apartment in an [facility]. CM sent referral per patient preference. CM following for needs.  ______________________  Ailyn CULVER, RN  Care Management  10/24/2024  11:18 AM    
Care Management Progress Note    Reason for Admission:   Shortness of breath [R06.02]  Chronic congestive heart failure, unspecified heart failure type (HCC) [I50.9]         Patient Admission Status: Inpatient  RUR:   Hospitalization in the last 30 days (Readmission):  No        Transition of care plan:  Per IDR, patient with ECHO pending. Dispo is home with family once medically cleared for dc. Family will transport at time of dc.  ______________________  Ailyn CULVER, RN  Care Management  10/23/2024  11:48 AM   
Care Management Progress Note    Reason for Admission:   Shortness of breath [R06.02]  Chronic congestive heart failure, unspecified heart failure type (HCC) [I50.9]         Patient Admission Status: Inpatient  RUR: 15% Moderate  Hospitalization in the last 30 days (Readmission):  No        Transition of care plan:  Patient is stable for DC home with hospice.  Return to [facility] with [facility] on Monday 10/28/24.   Discharge plan communicated with patient and/or discharge caregiver: Yes    Date 1st IMM letter given: 10/23/24  Outpatient follow-up: PCP  Transport at discharge: SKYLER perez. Patient on oxygen.  CM contacted patient's son Giovanny Ley, [phone]. Giovanny is going out of the country tomorrow and returning next Sunday. His sister Concepcion STRONG will be the point of contact in his absence and can be reached at [phone]. Giovanny requesting DC on Monday so that the family will have time to get things situated at the [facility]. Patient's wife also resides with him at the [facility].   CM contacted Teresa with [facility] hospice [phone] to provide update. Teresa confirmed hospice admission Monday at 11 am.  CM attempted contact with the DON at [facility] [phone] [address], [place] [address]. CM left voice message.    1:13 CM received return phone call from WM Verde at [facility]. CM can send DC summary on Monday to [phone] and nurse can call report to Mehreen or Mirna.       Juani Perdomo, MS  [phone]  
Care Management Progress Note    Reason for Admission:   Shortness of breath [R06.02]  Chronic congestive heart failure, unspecified heart failure type (HCC) [I50.9]         Patient Admission Status: Inpatient  RUR: 17% Moderate  Hospitalization in the last 30 days (Readmission):  No        Transition of care plan:  Return to [facility] admission today. Teresa mederos, [phone]  Discharge plan communicated with patient and/or discharge caregiver: Yes    Date 1st IMM letter given: 10/23/24  Outpatient follow-up: PCP  Transport at discharge: Hospital to Home at 11:00am  Nurse to call report to [phone]    Juani Perdomo, MS  [phone]  
STEFAN MAI 1/31/1927 [id number]               Secondary Coverage       Payor Plan Insurance Group Employer/Plan Group    [id number]       Payor Plan Address Payor Plan Phone Number Payor Plan Fax Number Effective Dates    [address] [phone]  11/1/2016 - None Entered    [facility] [address]         Subscriber Name Subscriber Birth Date Member ID       STEFAN MAI 1/31/1927 [id number]                     PCP: Vipul Sosa MD   Address: [address] [place], [place] [address]   Phone number: [phone]    Pharmacy:   [id number] [place], [place] - [address] [facility] [phone] - F [phone]  [address] [id number]  Phone: [phone] Fax: [phone]    [id number] - [place], [place] - [address] [phone] - F [phone]  [address] [id number]  Phone: [phone] Fax: [phone]    DC Transport: (P) Family       Transition of care plan:    [x]Unable to determine at this time. Awaiting clinical progress, and disposition recommendations.    [] Home. No assistance required.     [] Home. Pt refused recommended services.    [] Home with family assistance as needed, and outpatient follow-up.    [] Home with Outpatient PT and outpatient follow-up   Pt aware of OP appt? []Yes, Provider:   []Not scheduled   Transport provider:     [] Home with outpatient services.    Specify:    [] Home with Home Health   - [place] of Choice offered? [] Yes, Preference:   [] NA    []SNF/IPR   -[]Freedom of Choice offered, and preferences given:   []Listing provided and preferences requested   -Status: []Pending []Accepted:    -Auth required: []Yes []No    -Auth initiated date:   -3 midnight stay required: []Yes []No  Date satisfied:     [] LTC:     [] Home with Hospice   - [place] of Choice offered? [] Yes, Preference:   [] NA    [] [facility] information provided.     [] Other:

## 2024-10-28 NOTE — PROGRESS NOTES
[facility]                    Cardiology Care Note     []Initial Encounter     [x]Follow-up    Patient Name: Timur Ley - :1927 - MRN:[id number]  Primary Cardiologist: Brett Roper MD  Consulting Cardiologist: Roberto Frances DO     Reason for encounter: CHF     HPI:       Timur Ley is a 89 y.o. male with PMH significant for CHF, COPD, hypertension, permanent atrial fibrillation anticoagulated on Coumadin, sick sinus syndrome status post dual chamber pacemaker placement sent from cardiology clinic for evaluation ER for worsening shortness of breath.     He recently moved from the [facility] in the Summer of .     He lives in an Independent Living Facility      Patient reports worsening shortness of breath on exertion ongoing for the last few weeks.  States that he is normally pretty active however for the last week has been getting very winded even with putting on pants.  Denies any chest pain, recent fever/chills.  He has chronic bilateral lower extremity edema however noticed that this has also been worsening and now associated with weeping.      He takes Lasix 40 mg daily and additionally 20 mg 3 times a week.       pBNP 1093  Troponin 20   CT chest simple L pleural effusion     Subjective:      Timur Ley remains SOB.      Assessment and Plan     1 Acute HFrEF  - EF 25-30%  - cont lasix 40 mg IV BID, Cr holding   - I/O  - low na diet   - toprol XL 50 mg qd   - cont to add/titrate GDMT as vitals/kidney function allows   - would not recommend to pursue CRT  - given advanced age, lack of anginal symptoms would avoid ischemic eval   - pt is interested in hospice services, order placed and discussed w/ case management. Also consulted palliative care     2 Permanent a fib   - s/p PPM  - warfarin  - toprol     3 COPD    4 HTN  - toprol XL 50 mg     5 BIANCA vs CKD?  - unknown baseline  - Cr 1.39         ____________________________________________________________    Cardiac 
      [facility]                    Cardiology Care Note     []Initial Encounter     [x]Follow-up    Patient Name: Timur Ley - :1927 - MRN:[id number]  Primary Cardiologist: Brett Roper MD  Consulting Cardiologist: Roberto Frances DO     Reason for encounter: CHF     HPI:       Timur Ley is a 89 y.o. male with PMH significant for CHF, COPD, hypertension, permanent atrial fibrillation anticoagulated on Coumadin, sick sinus syndrome status post dual chamber pacemaker placement sent from cardiology clinic for evaluation ER for worsening shortness of breath.     He recently moved from the [facility] in the Summer of .     He lives in an Independent Living Facility      Patient reports worsening shortness of breath on exertion ongoing for the last few weeks.  States that he is normally pretty active however for the last week has been getting very winded even with putting on pants.  Denies any chest pain, recent fever/chills.  He has chronic bilateral lower extremity edema however noticed that this has also been worsening and now associated with weeping.      He takes Lasix 40 mg daily and additionally 20 mg 3 times a week.       pBNP 1093  Troponin 20   CT chest simple L pleural effusion     Subjective:      Timur Ley reports thinks his legs feel better, remains SOB.      Assessment and Plan     1 Acute HFrEF  - EF 25-30%  - cont lasix 40 mg IV BID for at least another day   - I/O  - low na diet   - toprol XL 50 mg qd   - cont to add/titrate GDMT  - discussed w/ pt that focus would be on medical management and treating his symptoms, he and daughter-in-law are in agreement     2 Permanent a fib   - s/p PPM  - warfarin  - toprol     3 COPD    4 HTN  - toprol XL 50 mg     5 BIANCA vs CKD?  - unknown baseline  - Cr 1.52, would allow for somewhat higher Cr to diurese          ____________________________________________________________    Cardiac testing  10/22/24    ECHO (TTE) COMPLETE 
  Physician Progress Note      PATIENT:               STEFAN MAI  CSN #:                  [id number]  :                       1927  ADMIT DATE:       10/22/2024 10:32 AM  DISCH DATE:  RESPONDING  PROVIDER #:        Mela Hernandez MD          QUERY TEXT:    Good day  Patient admitted with heart failure, noted to have \"likely CKD\"    If possible, please document in progress notes and discharge summary if you   are evaluating and/or treating any of the following:    The medical record reflects the following:  Risk Factors: age, hypertension, elevated creatinine levels  Clinical Indicators: Patient presented with worsening SOB and BLE edema with   weeping. Found to be in acute  HFrEF exacerbation and noted elevated CR/ GFR -1.43/ 45 >>>1.39/ 41  10/24 PN \"Cr elev: POA Likely CKD\"  Treatment: daily labs, palliative consult    Thank you  Joanne Bruno RN [facility]  [phone]  Options provided:  -- CKD Stage 3a GFR 45-59  -- CKD Stage 3b GFR 30-44  -- Other - I will add my own diagnosis  -- Disagree - Not applicable / Not valid  -- Disagree - Clinically unable to determine / Unknown  -- Refer to Clinical Documentation Reviewer    PROVIDER RESPONSE TEXT:    This patient has CKD Stage 3b.    Query created by: Joanne Bruno on 10/25/2024 4:27 PM      Electronically signed by:  Mela Hernandez MD 10/25/2024 9:38 PM          
 dictated [id number]  
0700: Bedside and Verbal shift change report given to JUAN Mendoza & JUAN Veloz (oncoming nurse) by JUAN Krishna (offgoing nurse). Report included the following information Nurse Handoff Report, Adult Overview, Recent Results, Cardiac Rhythm v paced, and Neuro Assessment.     
0845: notified Dr. Hernandez of pt AM vitals and meds. Orders to hold metoprolol and give lasix at this time.  
1102am-Have attempted to contact admission director,multiple times-left vm to provide AVS instructions and to notify pt is en route back to facility,[facility] pt up at 11am.    1120am-Attempted to contact facility to give report,[facility] facility representative,took message to give to facility director to return call back to nurse.    1225pm-Attempted to contact facility director at [facility],left vm to return call for report and to confirm pt has arrived at facility safely.    Unable to review AVS and provider instructions until return call has been placed.    1324-Spoke with [facility] facility director,provided medication changes and report on pt,stated she understood-no further questions,pt has arrived at facility safely.  
[facility] Pharmacy Dosing Services: 10/22/24   Consult for Warfarin Dosing by Pharmacy by Dr. Macdonald  Consult provided for this 89 y.o. male, for indication of A Fib/A Flutter    Day of Therapy: continuing prior to admission home dosing  Patient followed by [facility] cardiology MD Roper  Most recent clinic visit 10/16 current home dosing confirmed as warfarin 2 mg M / W / F and warfarin 1 mg all other days (T / Th / Sat / Sun)    Dose to achieve an INR goal of 2-3    A/Plan:  Order entered for  Warfarin  1 (mg) ordered to be given today at 18:00.   INR on admission 2.5  H/H/PLTs stable  Ordering INR daily, assess 10/23 AM lab and consider dosing pending level    Significant drug interactions, such as enoxaparin:    PT/INR Lab Results   Component Value Date/Time    INR 2.5 10/22/2024 11:49 AM      Platelets Lab Results   Component Value Date/Time     10/22/2024 10:56 AM      H/H Lab Results   Component Value Date/Time    HGB 11.5 10/22/2024 10:56 AM        Pharmacy to follow daily and will provide subsequent Warfarin dosing based on clinical status.  Marshall Ferguson [facility]) Contact information [phone]      
[facility]  [address] [place] [address] [phone]        Hospitalist Progress Note      NAME: Timur Ley   :  1927  MRM:  [id number]    Date/Time of service: 10/24/2024  2:58 PM       Subjective:     Chief Complaint:  Patient was personally seen and examined by me during this time period.  Chart reviewed.  F/up CHF exas.    Feeling better. Some SOB. No CP.       Objective:       Vitals:       Last 24hrs VS reviewed since prior progress note. Most recent are:    Vitals:    10/24/24 1315   BP: (!) 104/47   Pulse: (!) 107   Resp: 22   Temp: 98.1 °F (36.7 °C)   SpO2: 96%     SpO2 Readings from Last 6 Encounters:   10/24/24 96%   10/22/24 93%   24 94%   24 96%   24 95%          Intake/Output Summary (Last 24 hours) at 10/24/2024 1458  Last data filed at 10/24/2024 0934  Gross per 24 hour   Intake 240 ml   Output 605 ml   Net -365 ml        Exam:     Physical Exam:    Gen:  Well-developed, well-nourished, in no acute distress  HEENT:  Pink conjunctivae, hearing intact to voice, moist mucous membranes  Resp:  No accessory muscle use, bibasilar rales  Card:  No murmurs, normal S1, S2 without thrills, bruits. 1+ pitting edema bilaterally.   Abd:  Soft, non-tender, non-distended  Skin:  No rashes or ulcers, skin turgor is good  Neuro:  Cranial nerves 3-12 are grossly intact, follows commands appropriately  Psych:  Good insight, oriented to person, place and time, alert      Medications Reviewed: (see below)    Lab Data Reviewed: (see below)    ______________________________________________________________________    Medications:     Current Facility-Administered Medications   Medication Dose Route Frequency    warfarin (COUMADIN) tablet 1 mg  1 mg Oral Once    furosemide (LASIX) injection 40 mg  40 mg IntraVENous BID    ipratropium 0.5 mg-albuterol 2.5 mg (DUONEB) nebulizer solution 1 Dose  1 Dose Inhalation Q6H PRN    warfarin placeholder: dosing 
[facility]  [address] [place] [address] [phone]        Hospitalist Progress Note      NAME: Timur Ley   :  1927  MRM:  [id number]    Date/Time of service: 10/27/2024  1:23 PM       Subjective:     Chief Complaint:  Patient was personally seen and examined by me during this time period.  Chart reviewed.      No complaints. Still appears dyspneic. More wheezy today        Objective:       Vitals:       Last 24hrs VS reviewed since prior progress note. Most recent are:    Vitals:    10/27/24 1152   BP: (!) 108/50   Pulse: 71   Resp: 16   Temp: 97.9 °F (36.6 °C)   SpO2: 99%     SpO2 Readings from Last 6 Encounters:   10/27/24 99%   10/22/24 93%   24 94%   24 96%   24 95%          Intake/Output Summary (Last 24 hours) at 10/27/2024 1323  Last data filed at 10/27/2024 0956  Gross per 24 hour   Intake 660 ml   Output 401 ml   Net 259 ml        Exam:     Physical Exam:    Gen:  Well-developed, well-nourished, in no acute distress  HEENT:  Pink conjunctivae, hearing intact to voice, moist mucous membranes  Resp: scattered expiratory wheezing; more diffuse   Card:  LE edema bilaterally improving   Abd:  Soft, non-tender, non-distended  Skin:  No rashes or ulcers, skin turgor is good  Neuro:  Cranial nerves 3-12 are grossly intact, follows commands appropriately  Psych:  Good insight, oriented to person, place and time, alert      Medications Reviewed: (see below)    Lab Data Reviewed: (see below)    ______________________________________________________________________    Medications:     Current Facility-Administered Medications   Medication Dose Route Frequency    metoprolol succinate (TOPROL XL) extended release tablet 25 mg  25 mg Oral Daily    furosemide (LASIX) tablet 40 mg  40 mg Oral BID    warfarin (COUMADIN) tablet 1 mg  1 mg Oral Once    methylPREDNISolone sodium succ (SOLU-MEDROL) 40 mg in sterile water 1 mL injection  40 mg 
[facility]  [address] [place] [address] [phone]        Hospitalist Progress Note      NAME: Timur eLy   :  1927  MRM:  [id number]    Date/Time of service: 10/26/2024  2:49 PM       Subjective:     Chief Complaint:  Patient was personally seen and examined by me during this time period.  Chart reviewed.      Feels the same. Still with LE edema. Discussed adding duonebs, steroids as wheezing a bit more today. Denies cough        Objective:       Vitals:       Last 24hrs VS reviewed since prior progress note. Most recent are:    Vitals:    10/26/24 1128   BP: (!) 102/54   Pulse: 70   Resp: 18   Temp: 98.1 °F (36.7 °C)   SpO2: 98%     SpO2 Readings from Last 6 Encounters:   10/26/24 98%   10/22/24 93%   24 94%   24 96%   24 95%          Intake/Output Summary (Last 24 hours) at 10/26/2024 1449  Last data filed at 10/26/2024 1218  Gross per 24 hour   Intake 358 ml   Output 800 ml   Net -442 ml        Exam:     Physical Exam:    Gen:  Well-developed, well-nourished, in no acute distress  HEENT:  Pink conjunctivae, hearing intact to voice, moist mucous membranes  Resp: scattered expiratory wheeze  Card:  LE edema bilaterally   Abd:  Soft, non-tender, non-distended  Skin:  No rashes or ulcers, skin turgor is good  Neuro:  Cranial nerves 3-12 are grossly intact, follows commands appropriately  Psych:  Good insight, oriented to person, place and time, alert      Medications Reviewed: (see below)    Lab Data Reviewed: (see below)    ______________________________________________________________________    Medications:     Current Facility-Administered Medications   Medication Dose Route Frequency    levalbuterol (XOPENEX) nebulizer solution 1.25 mg  1.25 mg Nebulization 4x Daily RT    ipratropium (ATROVENT) 0.02 % nebulizer solution 0.5 mg  0.5 mg Nebulization 4x Daily RT    predniSONE (DELTASONE) tablet 40 mg  40 mg Oral Daily    pantoprazole 
[facility]  [address] [place] [address] [phone]        Hospitalist Progress Note      NAME: Timur Ley   :  1927  MRM:  [id number]    Date/Time of service: 10/23/2024  7:59 AM       Subjective:     Chief Complaint:  Patient was personally seen and examined by me during this time period.  Chart reviewed.  Reports feeling improved with his breathing compared to yesterday.        Objective:       Vitals:       Last 24hrs VS reviewed since prior progress note. Most recent are:    Vitals:    10/23/24 0719   BP: 124/61   Pulse: 100   Resp: 18   Temp: 97.7 °F (36.5 °C)   SpO2: 96%     SpO2 Readings from Last 6 Encounters:   10/23/24 96%   10/22/24 93%   24 94%   24 96%   24 95%          Intake/Output Summary (Last 24 hours) at 10/23/2024 0757  Last data filed at 10/23/2024 0631  Gross per 24 hour   Intake --   Output 1000 ml   Net -1000 ml        Exam:     Physical Exam:    Gen:  Well-developed, well-nourished, in no acute distress  HEENT:  Pink conjunctivae, hearing intact to voice, moist mucous membranes  Neck:  Supple  Resp:  No accessory muscle use, crackles auscultated bilaterally.   Card:  No murmurs, normal S1, S2 without thrills, bruits. 2+ pitting edema bilaterally.   Abd:  Soft, non-tender, non-distended  Skin:  No rashes or ulcers, skin turgor is good  Neuro:  Cranial nerves 3-12 are grossly intact, follows commands appropriately  Psych:  Good insight, oriented to person, place and time, alert      Medications Reviewed: (see below)    Lab Data Reviewed: (see below)    ______________________________________________________________________    Medications:     Current Facility-Administered Medications   Medication Dose Route Frequency    furosemide (LASIX) injection 40 mg  40 mg IntraVENous BID    ipratropium 0.5 mg-albuterol 2.5 mg (DUONEB) nebulizer solution 1 Dose  1 Dose Inhalation Q6H PRN    warfarin placeholder: dosing by pharmacy 
[facility]  [address] [place] [address] [phone]        Hospitalist Progress Note      NAME: Timur Ley   :  1927  MRM:  [id number]    Date/Time of service: 10/25/2024  2:34 PM       Subjective:     Chief Complaint:  Patient was personally seen and examined by me during this time period.  Chart reviewed.      Pt feeling somewhat improved. Urinating frequently. LE edema improving.        Objective:       Vitals:       Last 24hrs VS reviewed since prior progress note. Most recent are:    Vitals:    10/25/24 1228   BP: 104/61   Pulse: (!) 107   Resp: 16   Temp: 97.9 °F (36.6 °C)   SpO2: 99%     SpO2 Readings from Last 6 Encounters:   10/25/24 99%   10/22/24 93%   24 94%   24 96%   24 95%          Intake/Output Summary (Last 24 hours) at 10/25/2024 1434  Last data filed at 10/25/2024 1243  Gross per 24 hour   Intake 720 ml   Output 1550 ml   Net -830 ml        Exam:     Physical Exam:    Gen:  Well-developed, well-nourished, in no acute distress  HEENT:  Pink conjunctivae, hearing intact to voice, moist mucous membranes  Resp: scattered expiratory wheeze  Card: tachycardic. LE edema bilaterally   Abd:  Soft, non-tender, non-distended  Skin:  No rashes or ulcers, skin turgor is good  Neuro:  Cranial nerves 3-12 are grossly intact, follows commands appropriately  Psych:  Good insight, oriented to person, place and time, alert      Medications Reviewed: (see below)    Lab Data Reviewed: (see below)    ______________________________________________________________________    Medications:     Current Facility-Administered Medications   Medication Dose Route Frequency    ondansetron (ZOFRAN) injection 4 mg  4 mg IntraVENous Q6H PRN    warfarin (COUMADIN) tablet 2 mg  2 mg Oral Once    arformoterol tartrate (BROVANA) nebulizer solution 15 mcg  15 mcg Nebulization BID RT    budesonide (PULMICORT) nebulizer suspension 500 mcg  0.5 mg Nebulization BID 
[facility] Pharmacy Dosing Services: 10/23/24  Consult for Warfarin Dosing by Pharmacy by Dr. Macdonald  Consult provided for this 89 yo male for indication of AFib  Day of Therapy: resumed from home. (PTA: Warfarin 2 mg Mon/Wed/Fri and 1mg on Tue/Thur/Sat/Sun)  Dose to achieve an INR goal of 2-3    Order entered for Warfarin  1 (mg) ordered to be given today at 18:00.     Significant drug interactions: Acute CHF  Previous dose    PT/INR Lab Results   Component Value Date/Time    INR 2.7 10/23/2024 02:56 AM      Platelets Lab Results   Component Value Date/Time     10/23/2024 02:56 AM      H/H Lab Results   Component Value Date/Time    HGB 11.6 10/23/2024 02:56 AM        Pharmacy to follow daily and will provide subsequent Warfarin dosing based on clinical status.  TIFFANIE TORREZ [facility])   Contact information [phone]    
[facility] Pharmacy Dosing Services: 10/25/24  Consult for Warfarin Dosing by Pharmacy by Dr. Macdonald  Consult provided for this 89 yo male for indication of AFib  Day of Therapy: resumed from home. (PTA: Warfarin 2 mg Mon/Wed/Fri and 1mg on Tue/Thur/Sat/Sun)  Dose to achieve an INR goal of 2-3  INR 2.6 today (therapeutic)  Order entered for Warfarin 2 mg ordered to be given today at 18:00.     Significant drug interactions: Acute CHF  Previous dose    PT/INR Lab Results   Component Value Date/Time    INR 2.6 10/25/2024 05:19 AM      Platelets Lab Results   Component Value Date/Time     10/24/2024 01:14 AM      H/H Lab Results   Component Value Date/Time    HGB 10.0 10/24/2024 01:14 AM        Pharmacy to follow daily and will provide subsequent Warfarin dosing based on clinical status.  JAN MACIEL, [facility])   Contact information [phone]  
[facility] Pharmacy Dosing Services: 10/26/24  Consult for Warfarin Dosing by Pharmacy by Dr. Macdonald  Consult provided for this 89 yo male for indication of AFib  Day of Therapy: resumed from home. (PTA: Warfarin 2 mg Mon/Wed/Fri and 1mg on Tue/Thur/Sat/Sun)  Dose to achieve an INR goal of 2-3  INR 2.7 today (therapeutc)  Order entered for Warfarin 1 mg ordered to be given today at 18:00.     Significant drug interactions: Acute CHF, Prednisone  Previous dose    PT/INR Lab Results   Component Value Date/Time    INR 2.7 10/26/2024 04:15 AM      Platelets Lab Results   Component Value Date/Time     10/24/2024 01:14 AM      H/H Lab Results   Component Value Date/Time    HGB 10.0 10/24/2024 01:14 AM        Pharmacy to follow daily and will provide subsequent Warfarin dosing based on clinical status.  Autumn Nuno, [facility])   Contact information [phone]    
Occupational Therapy  Order received and chart reviewed, noted patient to go home with hospice, will sign off.   Amie Moore, OTR/L      
[facility] Pharmacy Dosing Services: 10/24/24  Consult for Warfarin Dosing by Pharmacy by Dr. Macdonald  Consult provided for this 89 yo male for indication of AFib  Day of Therapy: resumed from home. (PTA: Warfarin 2 mg Mon/Wed/Fri and 1mg on Tue/Thur/Sat/Sun)  Dose to achieve an INR goal of 2-3    Order entered for Warfarin  1 (mg) ordered to be given today at 18:00.     Significant drug interactions: Acute CHF  Previous dose    PT/INR Lab Results   Component Value Date/Time    INR 2.7 10/24/2024 01:14 AM      Platelets Lab Results   Component Value Date/Time     10/24/2024 01:14 AM      H/H Lab Results   Component Value Date/Time    HGB 10.0 10/24/2024 01:14 AM        Pharmacy to follow daily and will provide subsequent Warfarin dosing based on clinical status.  TIFFANIE TORREZ [facility])   Contact information [phone]      
Orders for PT for home O2 assessment but plan of care home to [facility] on hospice care. Hospice would provide O2 nasal cannula as necessary for comfort. A Skilled PT evaluation would not need to determine this if hospice managing. Will Sign off  Kassidy Marroquin, PT, DPT  
Patient is oriented and has been refusing turns offered by RN. RN educated patient about the importance of turning in the bed to prevent pressure ulcers. Pt is agreeable to elevating heels off the bed with pillows.   
Physical Therapy  Patient is going with hospice services, we will complete the order.  Thank you.  Ira Dominguez PT,DPT,NCS,CLT    
Physical therapy services attempted 9:53AM. Per cursory review of medical chart and communication with RN Team, Pt pending return to [place] [facility] [place] with [facility] today. No PT needs prior to discharge. Accordingly, will dc PT and marely \"complete\"    RN Team aware.    Cori Lainez, PT, DPT   
Pt ordered for Home O2 evaluation.  Pt is a high fall risk.  Will coordinate with SANTINO Javier to assess.  
[facility] Pharmacy Dosing Services: 10/27/24  Consult for Warfarin Dosing by Pharmacy by Dr. Macdonald  Consult provided for this 89 yo male for indication of AFib  Day of Therapy: resumed from home. (PTA: Warfarin 2 mg Mon/Wed/Fri and 1mg on Tue/Thur/Sat/Sun)  Dose to achieve an INR goal of 2-3  INR 3 today (therapeutic)  Order entered for Warfarin 1 mg ordered to be given today at 18:00.     Significant drug interactions: Acute CHF, Prednisone   Previous dose    PT/INR Lab Results   Component Value Date/Time    INR 3.0 10/27/2024 04:01 AM      Platelets Lab Results   Component Value Date/Time     10/24/2024 01:14 AM      H/H Lab Results   Component Value Date/Time    HGB 10.0 10/24/2024 01:14 AM        Pharmacy to follow daily and will provide subsequent Warfarin dosing based on clinical status.  Johnny Gray [facility])   Contact information [phone]  
Spiritual Health History and Assessment/Progress Note  [facility]    Initial Encounter,  ,  ,      Name: Timur Ley MRN: [id number]    Age: 89 y.o.     Sex: male   Language: English   Holiness: Mandaeism   Shortness of breath     Date: 10/24/2024            Total Time Calculated: 35 min              Spiritual Assessment began in [facility]3 INTERMEDIATE CARE UNIT        Referral/Consult From: Multi-disciplinary team   Encounter Overview/Reason: Initial Encounter  Service Provided For: Patient, Family    Thalia, Belief, Meaning:   Patient identifies as spiritual, is connected with a thalia tradition or spiritual practice, has beliefs or practices that help with coping during difficult times, and Other: Mandaeism  Family/Friends identify as spiritual, are connected with a thalia tradition or spiritual practice, and have beliefs or practices that help with coping during difficult times      Importance and Influence:  Patient has spiritual/personal beliefs that influence decisions regarding their health  Family/Friends have spiritual/personal beliefs that influence decisions regarding the patient's health    Community:  Patient feels well-supported. Support system includes: Spouse/Partner, Children, and Extended family  Family/Friends feel well-supported. Support system includes: Spouse/Partner, Children, and Extended family    Assessment and Plan of Care:     Patient Interventions include: Facilitated expression of thoughts and feelings, Explored spiritual coping/struggle/distress, Engaged in theological reflection, and Affirmed coping skills/support systems  Family/Friends Interventions include: Facilitated expression of thoughts and feelings, Explored spiritual coping/struggle/distress, Engaged in theological reflection, and Affirmed coping skills/support systems    Patient Plan of Care: Spiritual Care available upon further referral  Family/Friends Plan of Care: Spiritual Care available upon further 
[facility] Dosing Services: 10/28/2024    Consult for Warfarin Dosing by Pharmacy by Dr. Macdonald  Consult provided for this 89 yo male for indication of AFib  Day of Therapy: resumed from home. (PTA: Warfarin 2 mg Mon/Wed/Fri and 1mg on Tue/Thur/Sat/Sun)  Dose to achieve an INR goal of 2-3    Assessment/Plan: INR is supratherapeutic at 3.7 today (therapeutic) - no warfarin today, 10/28/2024.    Significant drug interactions: Acute CHF, Prednisone   PT/INR Lab Results   Component Value Date/Time    INR 3.7 10/28/2024 02:09 AM      Platelets Lab Results   Component Value Date/Time     10/28/2024 02:09 AM      H/H Lab Results   Component Value Date/Time    HGB 9.5 10/28/2024 02:09 AM        Pharmacist will follow daily and will provide subsequent Warfarin dosing based on clinical status.  SEA DEAN, [facility]  
Normal and  indeterminate size lymph nodes without contrast.  LIZBETH: No mass or lymphadenopathy without contrast..  THORACIC AORTA: No aneurysm. The ascending aorta is ectatic at 3.8 cm. Heavy  arterial calcification.  MAIN PULMONARY ARTERY: Normal in caliber.  TRACHEA/BRONCHI: Patent.  ESOPHAGUS: No wall thickening or dilatation.  HEART: Normal in size. Coronary artery calcium: present  PLEURA: Left pleural effusion moderate in size and water density.  LUNGS: No nodule, mass, or airspace disease. Biapical pleural-parenchymal  thickening. Bibasilar atelectasis and interstitial prominence, left greater than  right. Single 6.4 mm nodule right lower lobe apical segment. 3.2 mm nodule right  upper lobe. Generalized emphysematous change.  INCIDENTALLY IMAGED UPPER ABDOMEN: No significant abnormality in the  incidentally imaged upper abdomen.  BONES: Multilevel compression deformities, age indeterminate, in the upper, mid  and lower thoracic spine status post kyphoplasty at the levels T12 and T8.    Impression  1. Bibasilar atelectasis, scar and emphysematous change with simple left pleural  effusion.  2. 2 pulmonary nodules in the right lung.  3. Multilevel compression deformities status post kyphoplasty as described  above.  4. Other incidental findings.    Electronically signed by JOBY WATKINS      Lab Results   Component Value Date    WBC 4.6 10/22/2024    HGB 11.5 (L) 10/22/2024    HCT 37.3 10/22/2024    MCV 98.4 10/22/2024     10/22/2024       No results for input(s): \"CHOL\", \"HDLC\", \"LDLC\", \"HBA1C\" in the last 72 hours.    Invalid input(s): \"TGL\"    Lab Results   Component Value Date/Time     10/22/2024 10:56 AM    K 4.2 10/22/2024 10:56 AM     10/22/2024 10:56 AM    CO2 37 10/22/2024 10:56 AM    BUN 27 10/22/2024 10:56 AM    CREATININE 1.43 10/22/2024 10:56 AM    GLUCOSE 128 10/22/2024 10:56 AM    CALCIUM 8.8 10/22/2024 10:56 AM    LABGLOM 45 10/22/2024 10:56 AM      No results found for: 
furosemide (LASIX) injection 40 mg, 40 mg, IntraVENous, BID, Rosa Macdonald MD, 40 mg at 10/24/24 0826    ipratropium 0.5 mg-albuterol 2.5 mg (DUONEB) nebulizer solution 1 Dose, 1 Dose, Inhalation, Q6H PRN, Rosa Macdonald MD, 1 Dose at 10/24/24 1636    warfarin placeholder: dosing by pharmacy, , Other, RX Placeholder, Rosa Macdonald MD    metoprolol succinate (TOPROL XL) extended release tablet 50 mg, 50 mg, Oral, Daily, Rosa Macdonald MD, 50 mg at 10/24/24 0825    potassium chloride (KLOR-CON) extended release tablet 20 mEq, 20 mEq, Oral, Daily, Rosa Macdonald MD, 20 mEq at 10/24/24 0826    CHUCK Rivera - NP    [facility]  Call center: (P) [phone]  (F) [phone]      CC:Vipul Sosa MD